# Patient Record
Sex: FEMALE | Race: BLACK OR AFRICAN AMERICAN | NOT HISPANIC OR LATINO | URBAN - METROPOLITAN AREA
[De-identification: names, ages, dates, MRNs, and addresses within clinical notes are randomized per-mention and may not be internally consistent; named-entity substitution may affect disease eponyms.]

---

## 2017-03-22 ENCOUNTER — HOSPITAL ENCOUNTER (EMERGENCY)
Facility: HOSPITAL | Age: 27
Discharge: HOME | End: 2017-03-22
Payer: MEDICAID

## 2017-03-22 VITALS
SYSTOLIC BLOOD PRESSURE: 128 MMHG | WEIGHT: 156.94 LBS | HEART RATE: 94 BPM | TEMPERATURE: 99 F | DIASTOLIC BLOOD PRESSURE: 92 MMHG | BODY MASS INDEX: 26.79 KG/M2 | OXYGEN SATURATION: 100 % | RESPIRATION RATE: 17 BRPM | HEIGHT: 64 IN

## 2017-03-22 DIAGNOSIS — K08.89 PAIN, DENTAL: Primary | ICD-10-CM

## 2017-03-22 PROCEDURE — 99283 EMERGENCY DEPT VISIT LOW MDM: CPT

## 2017-03-22 RX ORDER — PENICILLIN V POTASSIUM 500 MG/1
500 TABLET, FILM COATED ORAL 4 TIMES DAILY
Qty: 28 TABLET | Refills: 0 | Status: SHIPPED | OUTPATIENT
Start: 2017-03-22 | End: 2017-03-29

## 2017-03-22 RX ORDER — DICLOFENAC SODIUM 25 MG/1
25 TABLET, DELAYED RELEASE ORAL 3 TIMES DAILY
Qty: 20 TABLET | Refills: 0 | Status: SHIPPED | OUTPATIENT
Start: 2017-03-22 | End: 2017-05-29

## 2017-03-22 NOTE — ED NOTES
Armband checked, patient verified. Verified by patient spelling and stating the correct name on armband along with correct date of birth.

## 2017-03-22 NOTE — ED AVS SNAPSHOT
[facility]  [address] [address]  [place] [address]               Patricia Melendez   3/22/2017  4:16 PM   ED    Description:  Female : 1990   Department:  [facility]           Your Care was Coordinated By:     Provider Role From To    HANNAH Alcantar Physician Assistant 17 [id number] --      Reason for Visit     Dental Pain           Diagnoses this Visit        Comments    Pain, dental    -  Primary       ED Disposition     None           To Do List           Follow-up Information     Follow up with Kelvin Brothers MD In 2 days.    Specialty:  Family Medicine    Why:  As needed, If symptoms worsen return to ED     Contact information:    [address] [facility]  [place] [address]  [phone]         These Medications        Disp Refills Start End    penicillin v potassium (VEETID) 500 MG tablet 28 tablet 0 3/22/2017 3/29/2017    Take 1 tablet (500 mg total) by mouth 4 (four) times daily. - Oral    Pharmacy: [facility]/pharmacy #[id number] - [place] - [address] [facility] Ph #: [phone]       diclofenac (VOLTAREN) 25 MG TbEC 20 tablet 0 3/22/2017     Take 1 tablet (25 mg total) by mouth 3 (three) times daily. - Oral    Pharmacy: [facility]/pharmacy #[id number] - [place], [place] - [address] [facility] Ph #: [phone]         [facility]     [facility] -  Assistance  Registered nurses in the [facility] provide clinical advisement, health education, appointment booking, and other advisory services.  Call for this free service at [phone].             Medications           Message regarding Medications     Verify the changes and/or additions to your medication regime listed below are the same as discussed with your clinician today.  If any of these changes or additions are incorrect, please notify your healthcare provider.        START taking these NEW medications        Refills    penicillin v  "potassium (VEETID) 500 MG tablet 0    Sig: Take 1 tablet (500 mg total) by mouth 4 (four) times daily.    Class: Print    Route: Oral    diclofenac (VOLTAREN) 25 MG TbEC 0    Sig: Take 1 tablet (25 mg total) by mouth 3 (three) times daily.    Class: Print    Route: Oral           Verify that the below list of medications is an accurate representation of the medications you are currently taking.  If none reported, the list may be blank. If incorrect, please contact your healthcare provider. Carry this list with you in case of emergency.           Current Medications     diclofenac (VOLTAREN) 25 MG TbEC Take 1 tablet (25 mg total) by mouth 3 (three) times daily.    hydrocodone-acetaminophen 5-325mg (NORCO) 5-325 mg per tablet Take 1 tablet by mouth every 4 (four) hours as needed for Pain.    naproxen (NAPROSYN) 375 MG tablet Take 1 tablet (375 mg total) by mouth 2 (two) times daily with meals.    ondansetron (ZOFRAN) 4 MG tablet Take 1 tablet (4 mg total) by mouth every 8 (eight) hours as needed.    penicillin v potassium (VEETID) 500 MG tablet Take 1 tablet (500 mg total) by mouth 4 (four) times daily.           Clinical Reference Information           Your Vitals Were     BP Pulse Temp Resp Height Weight    128/92 (BP Location: Left arm, Patient Position: Sitting) 94 98.8 °F (37.1 °C) (Oral) 17 5' 4" (1.626 m) 71.2 kg (156 lb 15.5 oz)    SpO2 BMI             100% 26.94 kg/m2         Allergies as of 3/22/2017     No Known Allergies      Immunizations Administered on Date of Encounter - 3/22/2017     None      ED Micro, Lab, POCT     None      ED Imaging Orders     None      Discharge References/Attachments     DENTAL PAIN (ENGLISH)      [facility] Sign-Up     Activating your [facility] account is as easy as 1-2-3!     1) Visit [email or web address], select Sign Up Now, enter this activation code and your date of birth, then select Next.  [email or web address]  Expires: 5/6/2017  4:25 PM      2) Create a username and password to " use when you visit [facility] in the future and select a security question in case you lose your password and select Next.    3) Enter your e-mail address and click Sign Up!    Additional Information  If you have questions, please e-mail [email or web address] or call [phone] to talk to our [facility] staff. Remember, [facility] is NOT to be used for urgent needs. For medical emergencies, dial 911.         Smoking Cessation     If you would like to quit smoking:   You may be eligible for free services if you are a [place] resident and started smoking cigarettes before September 1, 1988.  Call the Smoking Cessation [employer] ([facility]) toll free at [phone] or ([phone] 788-6467.   Call 0-189-QUIT-NOW if you do not meet the above criteria.             [facility] complies with applicable Federal civil rights laws and does not discriminate on the basis of race, color, national origin, age, disability, or sex.        Language Assistance Services     ATTENTION: Language assistance services are available, free of charge. Please call [phone].      ATENCIÓN: Si habla español, tiene a lugo disposición servicios gratuitos de asistencia lingüística. Llame al [phone].     CHÚ Ý: N?u b?n nói Ti?ng Vi?t, có các d?ch v? h? tr? ngôn ng? mi?n phí dành cho b?n. G?i s? [phone].

## 2017-03-22 NOTE — ED PROVIDER NOTES
Encounter Date: 3/22/2017       History     Chief Complaint   Patient presents with    Dental Pain     L sided mouth pain since yesterday     Review of patient's allergies indicates:  No Known Allergies  HPI Comments: Pt reports to ED c/o dental pain that has been ongoing since yesterday. Pt says pain is located to bottom left side and reports swelling to left side of her face.  No alleviating factors. Pain is exacerbated with palpation of the area and eating.  Pt says she has not seen a dentist in over a year. Pt denies any fever, trismus, nausea, vomiting, CP, SOB, dysphagia, or any other associated sxs.      The history is provided by the patient.     Past Medical History:   Diagnosis Date    Asthma     Hypertension      Past Surgical History:   Procedure Laterality Date    NO PAST SURGERIES       Family History   Problem Relation Age of Onset    Hypertension Mother     Diabetes Maternal Grandmother      Social History   Substance Use Topics    Smoking status: Current Every Day Smoker     Packs/day: 0.50     Types: Cigarettes    Smokeless tobacco: Never Used    Alcohol use Yes      Comment: occassionally      Review of Systems   Constitutional: Negative for fever.   HENT: Positive for dental problem and facial swelling. Negative for ear pain, sore throat, trouble swallowing and voice change.    Respiratory: Negative for shortness of breath.    Cardiovascular: Negative for chest pain.   Gastrointestinal: Negative for nausea.   Genitourinary: Negative for dysuria.   Musculoskeletal: Negative for back pain.   Skin: Negative for rash.   Neurological: Negative for weakness.   Hematological: Does not bruise/bleed easily.   All other systems reviewed and are negative.      Physical Exam   Initial Vitals   BP Pulse Resp Temp SpO2   03/22/17 1613 03/22/17 1613 03/22/17 1613 03/22/17 1613 03/22/17 1613   128/92 94 17 98.8 °F (37.1 °C) 100 %     Physical Exam    Nursing note and vitals reviewed.  Constitutional:  She appears well-developed and well-nourished.   HENT:   Head: Normocephalic and atraumatic.   Mouth/Throat: Uvula is midline. No oral lesions. No trismus in the jaw. Dental caries present. No dental abscesses or uvula swelling.       Cracked tooth on bottom left side  No swelling below the angle of the mandible, no facial swelling appreciated.  Tenderness to palpation over left mandibular area.     Eyes: EOM are normal. Pupils are equal, round, and reactive to light.   Neck: Normal range of motion. Neck supple.   Cardiovascular: Normal rate and regular rhythm.   Pulmonary/Chest: Breath sounds normal.   Abdominal: Soft.   Musculoskeletal: Normal range of motion.   Neurological: She is alert and oriented to person, place, and time.         ED Course   Procedures  Labs Reviewed - No data to display                            ED Course     Clinical Impression:   The encounter diagnosis was Pain, dental.          HANNAH Alcantar  03/22/17 6010

## 2017-05-29 ENCOUNTER — HOSPITAL ENCOUNTER (EMERGENCY)
Facility: HOSPITAL | Age: 27
Discharge: HOME | End: 2017-05-29
Attending: EMERGENCY MEDICINE
Payer: MEDICAID

## 2017-05-29 VITALS
RESPIRATION RATE: 16 BRPM | HEIGHT: 64 IN | WEIGHT: 169 LBS | OXYGEN SATURATION: 100 % | SYSTOLIC BLOOD PRESSURE: 123 MMHG | TEMPERATURE: 98 F | DIASTOLIC BLOOD PRESSURE: 88 MMHG | HEART RATE: 83 BPM | BODY MASS INDEX: 28.85 KG/M2

## 2017-05-29 DIAGNOSIS — L28.2 PRURITIC RASH: ICD-10-CM

## 2017-05-29 DIAGNOSIS — B35.4 TINEA CORPORIS: Primary | ICD-10-CM

## 2017-05-29 PROCEDURE — 99283 EMERGENCY DEPT VISIT LOW MDM: CPT

## 2017-05-29 RX ORDER — GRISEOFULVIN 125 MG/1
250 TABLET ORAL DAILY
Qty: 21 TABLET | Refills: 0 | Status: SHIPPED | OUTPATIENT
Start: 2017-05-29 | End: 2017-06-19

## 2017-05-29 RX ORDER — PRENATAL VIT 91/IRON/FOLIC/DHA 28-975-200
COMBINATION PACKAGE (EA) ORAL
Qty: 15 G | Refills: 0 | Status: SHIPPED | OUTPATIENT
Start: 2017-05-29 | End: 2019-09-07

## 2017-05-29 NOTE — ED PROVIDER NOTES
Encounter Date: 5/29/2017       History     Chief Complaint   Patient presents with    Rash     generalized with itching.  also c/o cough     Review of patient's allergies indicates:  No Known Allergies    The history is provided by the patient.   Rash    This is a new problem. The current episode started several days ago (approximately one week ago). The problem has been gradually worsening. The problem is associated with an unknown factor. The rash is present on the torso and right upper leg. The pain is at a severity of 0/10. Associated symptoms include itching. Pertinent negatives include no blisters and no weeping. She has tried nothing for the symptoms.       PCP:   Kelvin Brothers MD        Past Medical History:   Diagnosis Date    Asthma     Hypertension      Past Surgical History:   Procedure Laterality Date    NO PAST SURGERIES       Family History   Problem Relation Age of Onset    Hypertension Mother     Diabetes Maternal Grandmother      Social History   Substance Use Topics    Smoking status: Current Every Day Smoker     Packs/day: 0.50     Types: Cigarettes    Smokeless tobacco: Never Used    Alcohol use Yes      Comment: occassionally      Review of Systems   Constitutional: Negative for fever.   HENT: Negative for sore throat.    Respiratory: Negative for shortness of breath.    Cardiovascular: Negative for chest pain.   Gastrointestinal: Negative for nausea.   Genitourinary: Negative for dysuria.   Musculoskeletal: Negative for back pain.   Skin: Positive for itching and rash.   Neurological: Negative for weakness.   Hematological: Does not bruise/bleed easily.       Physical Exam     Initial Vitals [05/29/17 1313]   BP Pulse Resp Temp SpO2   123/88 83 16 98.3 °F (36.8 °C) 100 %     Physical Exam    Nursing note and vitals reviewed.  Constitutional: Vital signs are normal. She appears well-developed and well-nourished. She is cooperative. She does not appear ill. No distress.   HENT:    Head: Normocephalic and atraumatic.   Nose: Nose normal.   Mouth/Throat: Uvula is midline, oropharynx is clear and moist and mucous membranes are normal.   Eyes: Conjunctivae, EOM and lids are normal. Pupils are equal, round, and reactive to light.   Neck: Trachea normal and normal range of motion. Neck supple.   Cardiovascular: Normal rate, regular rhythm, intact distal pulses and normal pulses.   Pulmonary/Chest: Effort normal. No respiratory distress.   Abdominal: Soft. She exhibits no distension and no mass. There is no tenderness. There is no rebound and no guarding.   Musculoskeletal: Normal range of motion. She exhibits no edema or tenderness.   Neurological: She is alert and oriented to person, place, and time. She has normal strength. No cranial nerve deficit or sensory deficit. GCS eye subscore is 4. GCS verbal subscore is 5. GCS motor subscore is 6.   Neurovascular intact to all extremities.  Normal gait.    Skin: Skin is warm and dry. Capillary refill takes less than 2 seconds. Lesion noted. No rash noted. There is erythema (annular, erythematous, scaly plaque noted to left upper chest, right lower abdomen and right upper thigh - the annular lesions measure approximately 2 cm in diameter - no streaking erythema or drainage noted).        Psychiatric: She has a normal mood and affect. Her speech is normal and behavior is normal. Judgment and thought content normal. Cognition and memory are normal.         ED Course   Procedures            Medical Decision Making:   History:   Old Records Summarized: records from clinic visits.                     Clinical Impression:       ICD-10-CM ICD-9-CM   1. Tinea corporis B35.4 110.5   2. Pruritic rash L28.2 698.2         Disposition:   Disposition: Discharged  Condition: Stable  I discussed with patient that the evaluation in the emergency department does not suggest any emergent or life threatening medical condition requiring immediate intervention beyond what  was provided in the ED, and I believe patient is safe for discharge.  Regardless, an unremarkable evaluation in the ED does not preclude the development or presence of a serious of life threatening condition. As such, patient was instructed to return immediately for any worsening or change in current symptoms. I also discussed the results of my evaluation and diagnosis with patient and she concurs with the evaluation and management plan.  Detailed written and verbal instructions provided to patient and she expressed a verbal understanding of the discharge instructions and overall management plan. Reiterated the importance of medication administration and safety and advised patient to follow up with primary care provider in 3-5 days or sooner if needed.  Also advised patient to return to the ER for any complications.     Regarding TINEA CORPORIS, instructions were provided to help prevent further contamination, including: washing all items that come into contact with infected skin; avoid sharing personal items; keeping skin, hair, and nails clean and dry; and avoid coming into contact with others diagnosed or suspected to have a fungal infection. Instructions were also provided about contacting the primary care provider for any: fever; worsening of infection despite 7 days of treatment; rash still present after two weeks the area around the rash; becomes red, warm, tender, swollen, or smells bad; or if there are any questions or concerns about the treatment or condition being treated. For treatment, I reiterated the importance of taking all medications as prescribed and the need to follow up with primary care provider.        Discharge Medication List as of 5/29/2017  1:49 PM      START taking these medications    Details   griseofulvin (HIRAL-PEG) 250 MG tablet Take 1 tablet (250 mg total) by mouth once daily., Starting Mon 5/29/2017, Until Mon 6/19/2017, Normal      terbinafine HCl (LAMISIL) 1 % cream Apply  topically to affected area twice daily., Normal             Follow-up Information     Call  Kelvin Brothers MD.    Specialty:  Family Medicine  Why:  for follow up in 2 weeks OR sooner if needed  Contact information:  [address] [facility] [address] [phone]             Go to  [facility].    Specialty:  Urgent Care  Why:  As needed  Contact information:  [address] [facility] [address] [phone]                        Edenilson Grayson NP  05/29/17 9779

## 2018-04-15 ENCOUNTER — HOSPITAL ENCOUNTER (EMERGENCY)
Facility: HOSPITAL | Age: 28
Discharge: ANOTHER HOSPITAL | End: 2018-04-15
Attending: EMERGENCY MEDICINE
Payer: MEDICAID

## 2018-04-15 VITALS
WEIGHT: 171 LBS | HEART RATE: 108 BPM | OXYGEN SATURATION: 98 % | DIASTOLIC BLOOD PRESSURE: 81 MMHG | SYSTOLIC BLOOD PRESSURE: 132 MMHG | TEMPERATURE: 100 F | RESPIRATION RATE: 20 BRPM | BODY MASS INDEX: 29.35 KG/M2

## 2018-04-15 DIAGNOSIS — Z72.0 TOBACCO ABUSE: ICD-10-CM

## 2018-04-15 DIAGNOSIS — R25.2 TRISMUS: ICD-10-CM

## 2018-04-15 DIAGNOSIS — J02.9 PHARYNGITIS, UNSPECIFIED ETIOLOGY: Primary | ICD-10-CM

## 2018-04-15 DIAGNOSIS — J36 PERITONSILLAR ABSCESS: ICD-10-CM

## 2018-04-15 DIAGNOSIS — A41.9 SEPSIS, DUE TO UNSPECIFIED ORGANISM: ICD-10-CM

## 2018-04-15 LAB
ALBUMIN SERPL BCP-MCNC: 4 G/DL
ALP SERPL-CCNC: 92 U/L
ALT SERPL W/O P-5'-P-CCNC: 19 U/L
ANION GAP SERPL CALC-SCNC: 14 MMOL/L
AST SERPL-CCNC: 20 U/L
B-HCG UR QL: NEGATIVE
BASOPHILS # BLD AUTO: 0.03 K/UL
BASOPHILS NFR BLD: 0.2 %
BILIRUB SERPL-MCNC: 1.3 MG/DL
BILIRUB UR QL STRIP: ABNORMAL
BUN SERPL-MCNC: 6 MG/DL
CALCIUM SERPL-MCNC: 10 MG/DL
CHLORIDE SERPL-SCNC: 100 MMOL/L
CLARITY UR REFRACT.AUTO: CLEAR
CO2 SERPL-SCNC: 22 MMOL/L
COLOR UR AUTO: YELLOW
CREAT SERPL-MCNC: 0.6 MG/DL
DIFFERENTIAL METHOD: ABNORMAL
EOSINOPHIL # BLD AUTO: 0.1 K/UL
EOSINOPHIL NFR BLD: 0.3 %
ERYTHROCYTE [DISTWIDTH] IN BLOOD BY AUTOMATED COUNT: 13.8 %
EST. GFR  (AFRICAN AMERICAN): >60 ML/MIN/1.73 M^2
EST. GFR  (NON AFRICAN AMERICAN): >60 ML/MIN/1.73 M^2
GLUCOSE SERPL-MCNC: 106 MG/DL
GLUCOSE UR QL STRIP: NEGATIVE
HCT VFR BLD AUTO: 41.7 %
HETEROPH AB SERPL QL IA: NEGATIVE
HGB BLD-MCNC: 13.7 G/DL
HGB UR QL STRIP: ABNORMAL
KETONES UR QL STRIP: ABNORMAL
LACTATE SERPL-SCNC: 0.8 MMOL/L
LEUKOCYTE ESTERASE UR QL STRIP: NEGATIVE
LYMPHOCYTES # BLD AUTO: 1.7 K/UL
LYMPHOCYTES NFR BLD: 9.8 %
MCH RBC QN AUTO: 28 PG
MCHC RBC AUTO-ENTMCNC: 32.9 G/DL
MCV RBC AUTO: 85 FL
MONOCYTES # BLD AUTO: 1.6 K/UL
MONOCYTES NFR BLD: 9.3 %
NEUTROPHILS # BLD AUTO: 13.5 K/UL
NEUTROPHILS NFR BLD: 80 %
NITRITE UR QL STRIP: NEGATIVE
PH UR STRIP: 6 [PH] (ref 5–8)
PLATELET # BLD AUTO: 267 K/UL
PMV BLD AUTO: 10.5 FL
POTASSIUM SERPL-SCNC: 3.8 MMOL/L
PROCALCITONIN SERPL IA-MCNC: 0.03 NG/ML
PROT SERPL-MCNC: 8.3 G/DL
PROT UR QL STRIP: ABNORMAL
RBC # BLD AUTO: 4.89 M/UL
SODIUM SERPL-SCNC: 136 MMOL/L
SP GR UR STRIP: >=1.03 (ref 1–1.03)
URN SPEC COLLECT METH UR: ABNORMAL
UROBILINOGEN UR STRIP-ACNC: ABNORMAL EU/DL
WBC # BLD AUTO: 16.83 K/UL

## 2018-04-15 PROCEDURE — 25000003 PHARM REV CODE 250: Performed by: EMERGENCY MEDICINE

## 2018-04-15 PROCEDURE — 83605 ASSAY OF LACTIC ACID: CPT

## 2018-04-15 PROCEDURE — 81003 URINALYSIS AUTO W/O SCOPE: CPT

## 2018-04-15 PROCEDURE — 63600175 PHARM REV CODE 636 W HCPCS: Performed by: EMERGENCY MEDICINE

## 2018-04-15 PROCEDURE — 80053 COMPREHEN METABOLIC PANEL: CPT

## 2018-04-15 PROCEDURE — 87040 BLOOD CULTURE FOR BACTERIA: CPT | Mod: 59

## 2018-04-15 PROCEDURE — 85025 COMPLETE CBC W/AUTO DIFF WBC: CPT

## 2018-04-15 PROCEDURE — 86308 HETEROPHILE ANTIBODY SCREEN: CPT

## 2018-04-15 PROCEDURE — S0030 INJECTION, METRONIDAZOLE: HCPCS | Performed by: EMERGENCY MEDICINE

## 2018-04-15 PROCEDURE — 81025 URINE PREGNANCY TEST: CPT

## 2018-04-15 PROCEDURE — 99285 EMERGENCY DEPT VISIT HI MDM: CPT | Mod: 25

## 2018-04-15 PROCEDURE — 84145 PROCALCITONIN (PCT): CPT

## 2018-04-15 PROCEDURE — 25500020 PHARM REV CODE 255: Performed by: EMERGENCY MEDICINE

## 2018-04-15 PROCEDURE — 96374 THER/PROPH/DIAG INJ IV PUSH: CPT | Mod: 59

## 2018-04-15 PROCEDURE — 96361 HYDRATE IV INFUSION ADD-ON: CPT

## 2018-04-15 PROCEDURE — 96376 TX/PRO/DX INJ SAME DRUG ADON: CPT | Mod: 59

## 2018-04-15 PROCEDURE — 96375 TX/PRO/DX INJ NEW DRUG ADDON: CPT | Mod: 59

## 2018-04-15 RX ORDER — MORPHINE SULFATE 4 MG/ML
2 INJECTION, SOLUTION INTRAMUSCULAR; INTRAVENOUS
Status: COMPLETED | OUTPATIENT
Start: 2018-04-15 | End: 2018-04-15

## 2018-04-15 RX ORDER — ACETAMINOPHEN 160 MG/5ML
650 SOLUTION ORAL
Status: COMPLETED | OUTPATIENT
Start: 2018-04-15 | End: 2018-04-15

## 2018-04-15 RX ORDER — CEFTRIAXONE 1 G/1
1 INJECTION, POWDER, FOR SOLUTION INTRAMUSCULAR; INTRAVENOUS
Status: COMPLETED | OUTPATIENT
Start: 2018-04-15 | End: 2018-04-15

## 2018-04-15 RX ORDER — ONDANSETRON 2 MG/ML
4 INJECTION INTRAMUSCULAR; INTRAVENOUS
Status: COMPLETED | OUTPATIENT
Start: 2018-04-15 | End: 2018-04-15

## 2018-04-15 RX ORDER — MORPHINE SULFATE 2 MG/ML
2 INJECTION, SOLUTION INTRAMUSCULAR; INTRAVENOUS
Status: DISCONTINUED | OUTPATIENT
Start: 2018-04-15 | End: 2018-04-15

## 2018-04-15 RX ORDER — MORPHINE SULFATE 2 MG/ML
2 INJECTION, SOLUTION INTRAMUSCULAR; INTRAVENOUS
Status: DISCONTINUED | OUTPATIENT
Start: 2018-04-15 | End: 2018-04-15 | Stop reason: SDUPTHER

## 2018-04-15 RX ORDER — METRONIDAZOLE 500 MG/100ML
500 INJECTION, SOLUTION INTRAVENOUS
Status: DISCONTINUED | OUTPATIENT
Start: 2018-04-15 | End: 2018-04-16 | Stop reason: HOSPADM

## 2018-04-15 RX ORDER — SODIUM CHLORIDE 9 MG/ML
1000 INJECTION, SOLUTION INTRAVENOUS
Status: COMPLETED | OUTPATIENT
Start: 2018-04-15 | End: 2018-04-15

## 2018-04-15 RX ADMIN — ACETAMINOPHEN 649.6 MG: 160 SOLUTION ORAL at 03:04

## 2018-04-15 RX ADMIN — ONDANSETRON 4 MG: 2 INJECTION INTRAMUSCULAR; INTRAVENOUS at 03:04

## 2018-04-15 RX ADMIN — MORPHINE SULFATE 2 MG: 4 INJECTION, SOLUTION INTRAMUSCULAR; INTRAVENOUS at 07:04

## 2018-04-15 RX ADMIN — MORPHINE SULFATE 2 MG: 4 INJECTION INTRAVENOUS at 03:04

## 2018-04-15 RX ADMIN — METRONIDAZOLE 500 MG: 500 INJECTION, SOLUTION INTRAVENOUS at 03:04

## 2018-04-15 RX ADMIN — SODIUM CHLORIDE 1000 ML: 0.9 INJECTION, SOLUTION INTRAVENOUS at 03:04

## 2018-04-15 RX ADMIN — SODIUM CHLORIDE 1000 ML: 0.9 INJECTION, SOLUTION INTRAVENOUS at 07:04

## 2018-04-15 RX ADMIN — SODIUM CHLORIDE 1000 ML: 0.9 INJECTION, SOLUTION INTRAVENOUS at 05:04

## 2018-04-15 RX ADMIN — CEFTRIAXONE SODIUM 1 G: 1 INJECTION, POWDER, FOR SOLUTION INTRAMUSCULAR; INTRAVENOUS at 03:04

## 2018-04-15 RX ADMIN — IOHEXOL 75 ML: 350 INJECTION, SOLUTION INTRAVENOUS at 04:04

## 2018-04-15 NOTE — ED NOTES
Pt updated on acceptance at [facility] and intent to transport via [facility]. No questions asked at this time. No s/s of respiratory distress noted.

## 2018-04-15 NOTE — ED NOTES
Verified no ent on call list  and called [facility] [place]  to verify- none on call.Dr. Gonzalez notified.

## 2018-04-15 NOTE — ED PROVIDER NOTES
Encounter Date: 4/15/2018       History     Chief Complaint   Patient presents with    Sore Throat     sore throat for 2 days.      CHIEF COMPLIANT: Sore Throat (sore throat for 2 days. )      4/15/2018, 2:26 PM     The history is provided by the patient. Patricia Melendez is a 27 y.o. female presenting to the ED for sore throat.  Onset yesterday.  Patient reports that the pain is 10 out of 10.  In fact patient prefers to write her answers down than to actually voice her answers.  She reports that the pain is worsened with swallowing.  It is associated with nausea, body aches, chills.  Patient reports recent treatment for Chlamydia.  Patient denies any vomiting, dysuria, urgency, frequency, diarrhea, rash    PCP: Kelvin Brothers MD  Specialist:             Review of patient's allergies indicates:  No Known Allergies  Past Medical History:   Diagnosis Date    Asthma     Hypertension      Past Surgical History:   Procedure Laterality Date    NO PAST SURGERIES       Family History   Problem Relation Age of Onset    Hypertension Mother     Diabetes Maternal Grandmother      Social History   Substance Use Topics    Smoking status: Current Every Day Smoker     Packs/day: 0.50     Types: Cigarettes    Smokeless tobacco: Never Used    Alcohol use Yes      Comment: occassionally      Review of Systems   Constitutional: Negative for fever.   HENT: Positive for sore throat. Negative for congestion, postnasal drip and rhinorrhea.    Respiratory: Negative for cough and shortness of breath.    Cardiovascular: Negative for chest pain.   Gastrointestinal: Negative for abdominal pain and nausea.   Genitourinary: Negative for dysuria.   Musculoskeletal: Negative for back pain.   Skin: Negative for rash.   Neurological: Negative for weakness.   Hematological: Does not bruise/bleed easily.       Physical Exam     Initial Vitals [04/15/18 1420]   BP Pulse Resp Temp SpO2   (!) 131/91 106 20 (!) 101 °F (38.3 °C) 100 %      MAP        104.33         Vitals:    04/15/18 1420 04/15/18 1505 04/15/18 1602   BP: (!) 131/91 (!) 134/91 138/77   Pulse: 106  99   Resp: 20  16   Temp: (!) 101 °F (38.3 °C)  100 °F (37.8 °C)   TempSrc: Oral  Oral   SpO2: 100%  97%   Weight: 77.6 kg (171 lb)         Physical Exam    Nursing note and vitals reviewed.  Constitutional: She appears well-developed and well-nourished.   Patient tearful crying.  She sitting in the chair.  She prefers to spit and used juice bottle.  There appears to be a hot potato voice.  There appears to be trismus.  No stridor.   HENT:   Head: Normocephalic and atraumatic.   Right Ear: External ear normal.   Left Ear: External ear normal.   Mouth/Throat: There is trismus in the jaw. No uvula swelling. Posterior oropharyngeal edema and posterior oropharyngeal erythema present.   Uvula midline.   Eyes: Conjunctivae and EOM are normal. Pupils are equal, round, and reactive to light.   Neck: Normal range of motion.   Cardiovascular: Normal rate, regular rhythm and normal heart sounds.   Pulmonary/Chest: Breath sounds normal. No respiratory distress.   Abdominal: Soft. Bowel sounds are normal. She exhibits no mass. There is no rebound and no guarding.   Musculoskeletal: Normal range of motion.   Lymphadenopathy:     She has cervical adenopathy (Right anterior chain.).   Neurological: She is alert and oriented to person, place, and time. She has normal strength. No cranial nerve deficit.   Cranial nerves II-XII intact   Skin: Skin is warm and dry.   Psychiatric: She has a normal mood and affect. Her speech is normal and behavior is normal. Thought content normal.         ED Course   Procedures  Labs Reviewed   CBC W/ AUTO DIFFERENTIAL - Abnormal; Notable for the following:        Result Value    WBC 16.83 (*)     Gran # (ANC) 13.5 (*)     Mono # 1.6 (*)     Gran% 80.0 (*)     Lymph% 9.8 (*)     All other components within normal limits   COMPREHENSIVE METABOLIC PANEL - Abnormal; Notable for the  following:     CO2 22 (*)     Total Bilirubin 1.3 (*)     All other components within normal limits   URINALYSIS - Abnormal; Notable for the following:     Specific Gravity, UA >=1.030 (*)     Protein, UA Trace (*)     Ketones, UA 2+ (*)     Bilirubin (UA) 1+ (*)     Occult Blood UA Trace (*)     Urobilinogen, UA 4.0-6.0 (*)     All other components within normal limits   CULTURE, BLOOD   CULTURE, BLOOD   HETEROPHILE AB SCREEN   PROCALCITONIN   LACTIC ACID, PLASMA   PREGNANCY TEST, URINE RAPID        Results for orders placed or performed during the hospital encounter of 04/15/18   Heterophile Ab Screen   Result Value Ref Range    Monospot Negative Negative   CBC auto differential   Result Value Ref Range    WBC 16.83 (H) 3.90 - 12.70 K/uL    RBC 4.89 4.00 - 5.40 M/uL    Hemoglobin 13.7 12.0 - 16.0 g/dL    Hematocrit 41.7 37.0 - 48.5 %    MCV 85 82 - 98 fL    MCH 28.0 27.0 - 31.0 pg    MCHC 32.9 32.0 - 36.0 g/dL    RDW 13.8 11.5 - 14.5 %    Platelets 267 150 - 350 K/uL    MPV 10.5 9.2 - 12.9 fL    Gran # (ANC) 13.5 (H) 1.8 - 7.7 K/uL    Lymph # 1.7 1.0 - 4.8 K/uL    Mono # 1.6 (H) 0.3 - 1.0 K/uL    Eos # 0.1 0.0 - 0.5 K/uL    Baso # 0.03 0.00 - 0.20 K/uL    Gran% 80.0 (H) 38.0 - 73.0 %    Lymph% 9.8 (L) 18.0 - 48.0 %    Mono% 9.3 4.0 - 15.0 %    Eosinophil% 0.3 0.0 - 8.0 %    Basophil% 0.2 0.0 - 1.9 %    Differential Method Automated    Comprehensive metabolic panel   Result Value Ref Range    Sodium 136 136 - 145 mmol/L    Potassium 3.8 3.5 - 5.1 mmol/L    Chloride 100 95 - 110 mmol/L    CO2 22 (L) 23 - 29 mmol/L    Glucose 106 70 - 110 mg/dL    BUN, Bld 6 6 - 20 mg/dL    Creatinine 0.6 0.5 - 1.4 mg/dL    Calcium 10.0 8.7 - 10.5 mg/dL    Total Protein 8.3 6.0 - 8.4 g/dL    Albumin 4.0 3.5 - 5.2 g/dL    Total Bilirubin 1.3 (H) 0.1 - 1.0 mg/dL    Alkaline Phosphatase 92 55 - 135 U/L    AST 20 10 - 40 U/L    ALT 19 10 - 44 U/L    Anion Gap 14 8 - 16 mmol/L    eGFR if African American >60.0 >60 mL/min/1.73 m^2    eGFR  if non African American >60.0 >60 mL/min/1.73 m^2   Procalcitonin   Result Value Ref Range    Procalcitonin 0.03 <0.25 ng/mL   Lactic acid, plasma   Result Value Ref Range    Lactate (Lactic Acid) 0.8 0.5 - 2.2 mmol/L   Urinalysis   Result Value Ref Range    Specimen UA Urine, Clean Catch     Color, UA Yellow Yellow, Straw, Luanne    Appearance, UA Clear Clear    pH, UA 6.0 5.0 - 8.0    Specific Gravity, UA >=1.030 (A) 1.005 - 1.030    Protein, UA Trace (A) Negative    Glucose, UA Negative Negative    Ketones, UA 2+ (A) Negative    Bilirubin (UA) 1+ (A) Negative    Occult Blood UA Trace (A) Negative    Nitrite, UA Negative Negative    Urobilinogen, UA 4.0-6.0 (A) <2.0 EU/dL    Leukocytes, UA Negative Negative   Pregnancy, urine rapid   Result Value Ref Range    Preg Test, Ur Negative      Imaging Results          CT Soft Tissue Neck With Contrast (Final result)  Result time 04/15/18 17:18:59    Final result by Sorin Brothers MD (04/15/18 17:18:59)                 Impression:     Circumferential prominence Waldeyer's lymphoid ringwith asymmetric swelling and ill-defined low attenuation right tonsillar pillar, 2 x 1.7 x 1.2 cm, characteristic of tonsillitis/phlegmon or early abscess without ring enhancement.  There is a similar smaller subtle area low attenuation left tonsillar pillar, 0.8 x 0.7 cm.  Bilaterally symmetric benign reactive adenopathy.       All CT scans at this facility use dose modulation, iterative reconstruction, and/or weight based dosing when appropriate to reduce radiation dose to as low as reasonably achievable.      Electronically signed by: SORIN BROTHERS  Date:     04/15/18  Time:    17:18              Narrative:    Exam: CT SOFT TISSUE NECK WITH CONTRAST     Indication: sore throat - trimus and drooling.  Looking for peritonsilar abscess      Technique: CT soft tissue neck performed with 3.75 mm axial imaging following 75 cc Omnipaque 350.  Coronal and sagittal reformations.    Comparison Study:  "None    Findings: There is asymmetric swelling and ill-defined low attenuation involving the right tonsillar pillar, 2.0 x 1.7 x 1.2 cm area.  There is also smaller subtle area of ill-defined low attenuation involving the left tonsillar pillar, 0.8 x 0.7 cm, without ring enhancement.      There is circumferential prominence of Waldeyer's lymphoid ring.      The tongue and floor of mouth musculature are normal.  Parapharyngeal spaces are well-preserved.  Normal larynx.  Subglottic airway is normal.  Lung apices are clear.      Normal thyroid gland.  Submandibular and parotid glands are normal.  There is no worrisome adenopathy.  There are symmetric benign reactive lymph nodes are noted submental, submandibular, jugulodigastric, and carotid space distribution, none of which are significantly enlarged by CT size criteria.    The regional vascular structures enhance normally.  There is a large mucosal cyst nearly completely filling the right sphenoid 2.6 cm.  There is a moderate dorsal thickening involving bilateral maxillary sinuses no maximal thickness 9 mm.  Old healed fracture deformity involving the left medial wall.  There are a couple dental carries.                               5:47 PM patient is tearful crying in the bed.  She does continue to spit in a old juice bottle".  I discussed results of CT scan.  Of note, tonsillar abscess is not excluded with a CT scan.  Unfortunately [facility] does not have ENT services available.  I do feel that she needs to be evaluated/consulted by ENT.  I discussed the indications for transfer.  Patient is requesting transfer to [facility].  I discussed with the patient and family members that patient requires ENT consultation.  Patient may not need to be admitted to the hospital.             All historical, clinical, radiographic, and laboratory findings were reviewed with the patient/family in detail.  I discussed the indications and treatment need (ENT) for transfer  to an outside " facility (rather than admission to our facility in [place]) secondary to no ENT services.  Patient/family verbalized understanding.   All remaining questions and concerns were addressed at that time and the patient/family agrees to proceed accordingly.  Similarly all pertinent details of the encounter were discussed with Leslie Nursing Supervisor at [facility].  Dr. Overton who agrees to accept the patient in transfer based on the needs/patient preferences outlined above.  Patient will be transferred by [facility] secondary to a need for ongoing fluids en route and pulse ox.  Risks:    loss of vitals signs, permanent neurologic damage, MVC ,  resulting in death,, or loss of neurologic function.  Benefits of transfer: ENT services.  Patient and family verbalized understanding.   Brinda Gonzalez, DO  6:29 PM    Pharyngitis, unspecified etiology    Sepsis, due to unspecified organism    Peritonsillar abscess    Trismus    Other orders  -     Heterophile Ab Screen; Standing  -     CBC auto differential; Standing  -     Comprehensive metabolic panel; Standing  -     Procalcitonin; Standing  -     Blood Culture #1 **CANNOT BE ORDERED STAT**; Standing  -     Blood Culture #2 **CANNOT BE ORDERED STAT**; Standing  -     Lactic acid, plasma; Standing  -     Urinalysis; Standing  -     Pregnancy, urine rapid; Standing  -     acetaminophen liquid 649.6 mg; Take 20.3 mLs (649.6 mg total) by mouth ED 1 Time.  -     CT Soft Tissue Neck With Contrast; Standing  -     cefTRIAXone injection 1 g; Inject 1 g into the vein ED 1 Time.  -     metronidazole IVPB 500 mg; Inject 100 mLs (500 mg total) into the vein every 8 (eight) hours.  -     Insert peripheral IV; Standing  -     sodium chloride 0.9% bolus 1,000 mL; Inject 1,000 mLs into the vein once.  -     Discontinue: morphine injection 2 mg; Inject 1 mL (2 mg total) into the vein ED 1 Time.  -     ondansetron injection 4 mg; Inject 4 mg into the vein ED 1 Time.  -      morphine injection 2 mg; Inject 0.5 mLs (2 mg total) into the vein ED 1 Time.  -     omnipaque 350 iohexol 75 mL; Inject 75 mLs into the vein ONCE PRN for contrast.  -     sodium chloride 0.9% bolus 1,000 mL; Inject 1,000 mLs into the vein once.  -     0.9%  NaCl infusion; Inject 1,000 mLs into the vein ED 1 Time.  -     morphine injection 2 mg; Inject 1 mL (2 mg total) into the vein ED 1 Time.                 Clinical Impression:       ICD-10-CM ICD-9-CM   1. Pharyngitis, unspecified etiology J02.9 462   2. Sepsis, due to unspecified organism A41.9 038.9     995.91   3. Peritonsillar abscess J36 475   4. Trismus R25.2 781.0         Disposition:   Disposition: Transferred  Condition: Stable                           Brinda CHRISTINE Gonzalez, DO  04/15/18 2156

## 2018-04-21 LAB
BACTERIA BLD CULT: NORMAL
BACTERIA BLD CULT: NORMAL

## 2018-09-21 ENCOUNTER — HOSPITAL ENCOUNTER (EMERGENCY)
Facility: HOSPITAL | Age: 28
Discharge: HOME | End: 2018-09-21
Attending: EMERGENCY MEDICINE
Payer: MEDICAID

## 2018-09-21 VITALS
BODY MASS INDEX: 28.78 KG/M2 | DIASTOLIC BLOOD PRESSURE: 90 MMHG | OXYGEN SATURATION: 100 % | RESPIRATION RATE: 18 BRPM | WEIGHT: 167.69 LBS | SYSTOLIC BLOOD PRESSURE: 137 MMHG | HEART RATE: 73 BPM | TEMPERATURE: 99 F

## 2018-09-21 DIAGNOSIS — J02.9 PHARYNGITIS, UNSPECIFIED ETIOLOGY: Primary | ICD-10-CM

## 2018-09-21 LAB — DEPRECATED S PYO AG THROAT QL EIA: NEGATIVE

## 2018-09-21 PROCEDURE — 87147 CULTURE TYPE IMMUNOLOGIC: CPT

## 2018-09-21 PROCEDURE — 87880 STREP A ASSAY W/OPTIC: CPT

## 2018-09-21 PROCEDURE — 99283 EMERGENCY DEPT VISIT LOW MDM: CPT

## 2018-09-21 PROCEDURE — 87081 CULTURE SCREEN ONLY: CPT

## 2018-09-21 RX ORDER — AMOXICILLIN 500 MG/1
500 CAPSULE ORAL 3 TIMES DAILY
Qty: 21 CAPSULE | Refills: 0 | Status: SHIPPED | OUTPATIENT
Start: 2018-09-21 | End: 2018-09-28

## 2018-09-21 NOTE — ED PROVIDER NOTES
"Encounter Date: 9/21/2018       History     Chief Complaint   Patient presents with    Sore Throat     "my tonsils are hurting" since last night. hx of tonsillar abscess     The history is provided by the patient.   Sore Throat   This is a new problem. The current episode started yesterday. The problem occurs constantly. The problem has not changed since onset.Pertinent negatives include no chest pain, no abdominal pain, no headaches and no shortness of breath. The symptoms are aggravated by drinking. Nothing relieves the symptoms. She has tried nothing for the symptoms. The treatment provided no relief.     Review of patient's allergies indicates:  No Known Allergies  Past Medical History:   Diagnosis Date    Asthma     Hypertension      Past Surgical History:   Procedure Laterality Date    NO PAST SURGERIES       Family History   Problem Relation Age of Onset    Hypertension Mother     Diabetes Maternal Grandmother      Social History     Tobacco Use    Smoking status: Current Every Day Smoker     Packs/day: 0.50     Types: Cigarettes    Smokeless tobacco: Never Used   Substance Use Topics    Alcohol use: Yes     Comment: occassionally     Drug use: No     Review of Systems   HENT: Positive for sore throat.    Respiratory: Negative for shortness of breath.    Cardiovascular: Negative for chest pain.   Gastrointestinal: Negative for abdominal pain.   Neurological: Negative for headaches.   All other systems reviewed and are negative.      Physical Exam     Initial Vitals [09/21/18 0714]   BP Pulse Resp Temp SpO2   (!) 137/90 73 18 98.7 °F (37.1 °C) 100 %      MAP       --         Physical Exam    Nursing note and vitals reviewed.  Constitutional: She appears well-developed and well-nourished. No distress.   HENT:   Head: Normocephalic and atraumatic.   Mouth/Throat: Uvula is midline and mucous membranes are normal. Oropharyngeal exudate and posterior oropharyngeal erythema present.   Eyes: EOM are normal. " Pupils are equal, round, and reactive to light.   Neck: Normal range of motion. Neck supple.   Cardiovascular: Normal rate, regular rhythm and normal heart sounds.   Pulmonary/Chest: Breath sounds normal. No respiratory distress. She has no wheezes.   Abdominal: Soft. Bowel sounds are normal.   Musculoskeletal: Normal range of motion.   Neurological: She is alert and oriented to person, place, and time. She has normal strength.   Skin: Skin is warm and dry.   Psychiatric: She has a normal mood and affect. Thought content normal.         ED Course   Procedures  Labs Reviewed   THROAT SCREEN, RAPID          Imaging Results    None     7:30 AM - Counseling: Spoke with the patient and discussed todays findings, in addition to providing specific details for the plan of care and counseling regarding the diagnosis and prognosis. Questions are answered at this time.                             Clinical Impression:   The encounter diagnosis was Pharyngitis, unspecified etiology.      Disposition:   Disposition: Discharged  Condition: Stable                        Yoni Aly MD  09/21/18 [id number]

## 2018-09-22 LAB — BACTERIA THROAT CULT: NORMAL

## 2019-02-24 ENCOUNTER — HOSPITAL ENCOUNTER (EMERGENCY)
Facility: HOSPITAL | Age: 29
Discharge: HOME | End: 2019-02-25
Attending: EMERGENCY MEDICINE
Payer: MEDICAID
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
## 2019-02-24 DIAGNOSIS — S09.90XA HEAD TRAUMA: ICD-10-CM

## 2019-02-24 DIAGNOSIS — S16.1XXA STRAIN OF NECK MUSCLE, INITIAL ENCOUNTER: ICD-10-CM

## 2019-02-24 DIAGNOSIS — F10.920 ALCOHOLIC INTOXICATION WITHOUT COMPLICATION: ICD-10-CM

## 2019-02-24 DIAGNOSIS — S01.112A LACERATION OF LEFT EYEBROW, INITIAL ENCOUNTER: Primary | ICD-10-CM

## 2019-02-24 LAB
ALBUMIN SERPL BCP-MCNC: 4.5 G/DL
ALP SERPL-CCNC: 87 U/L
ALT SERPL W/O P-5'-P-CCNC: 28 U/L
AMPHET+METHAMPHET UR QL: NEGATIVE
ANION GAP SERPL CALC-SCNC: 16 MMOL/L
AST SERPL-CCNC: 30 U/L
B-HCG UR QL: NEGATIVE
BARBITURATES UR QL SCN>200 NG/ML: NEGATIVE
BASOPHILS # BLD AUTO: 0.06 K/UL
BASOPHILS NFR BLD: 0.7 %
BENZODIAZ UR QL SCN>200 NG/ML: NEGATIVE
BILIRUB SERPL-MCNC: 0.3 MG/DL
BNP SERPL-MCNC: <10 PG/ML
BUN SERPL-MCNC: 6 MG/DL
BZE UR QL SCN: NEGATIVE
CALCIUM SERPL-MCNC: 9.4 MG/DL
CANNABINOIDS UR QL SCN: NEGATIVE
CHLORIDE SERPL-SCNC: 104 MMOL/L
CK SERPL-CCNC: 113 U/L
CO2 SERPL-SCNC: 23 MMOL/L
CREAT SERPL-MCNC: 0.7 MG/DL
CREAT UR-MCNC: 14.9 MG/DL
DIFFERENTIAL METHOD: ABNORMAL
EOSINOPHIL # BLD AUTO: 0.2 K/UL
EOSINOPHIL NFR BLD: 1.9 %
ERYTHROCYTE [DISTWIDTH] IN BLOOD BY AUTOMATED COUNT: 14.7 %
EST. GFR  (AFRICAN AMERICAN): >60 ML/MIN/1.73 M^2
EST. GFR  (NON AFRICAN AMERICAN): >60 ML/MIN/1.73 M^2
ETHANOL SERPL-MCNC: 249 MG/DL
ETHANOL SERPL-MCNC: 403 MG/DL
GLUCOSE SERPL-MCNC: 126 MG/DL
HCT VFR BLD AUTO: 43.7 %
HGB BLD-MCNC: 13.9 G/DL
INR PPP: 0.9
LYMPHOCYTES # BLD AUTO: 4.1 K/UL
LYMPHOCYTES NFR BLD: 50.8 %
MCH RBC QN AUTO: 27.6 PG
MCHC RBC AUTO-ENTMCNC: 31.8 G/DL
MCV RBC AUTO: 87 FL
METHADONE UR QL SCN>300 NG/ML: NEGATIVE
MONOCYTES # BLD AUTO: 0.6 K/UL
MONOCYTES NFR BLD: 7 %
NEUTROPHILS # BLD AUTO: 3.2 K/UL
NEUTROPHILS NFR BLD: 39.5 %
OPIATES UR QL SCN: NEGATIVE
PCP UR QL SCN>25 NG/ML: NEGATIVE
PLATELET # BLD AUTO: 351 K/UL
PMV BLD AUTO: 9.7 FL
POTASSIUM SERPL-SCNC: 3.9 MMOL/L
PROT SERPL-MCNC: 8.8 G/DL
PROTHROMBIN TIME: 9.4 SEC
RBC # BLD AUTO: 5.03 M/UL
SODIUM SERPL-SCNC: 143 MMOL/L
TOXICOLOGY INFORMATION: ABNORMAL
TROPONIN I SERPL DL<=0.01 NG/ML-MCNC: <0.006 NG/ML
WBC # BLD AUTO: 8.01 K/UL

## 2019-02-24 PROCEDURE — 90715 TDAP VACCINE 7 YRS/> IM: CPT | Mod: ER | Performed by: EMERGENCY MEDICINE

## 2019-02-24 PROCEDURE — 12011 RPR F/E/E/N/L/M 2.5 CM/<: CPT | Mod: ER

## 2019-02-24 PROCEDURE — 85025 COMPLETE CBC W/AUTO DIFF WBC: CPT | Mod: ER

## 2019-02-24 PROCEDURE — 25000003 PHARM REV CODE 250: Mod: ER | Performed by: EMERGENCY MEDICINE

## 2019-02-24 PROCEDURE — 99900035 HC TECH TIME PER 15 MIN (STAT): Mod: ER

## 2019-02-24 PROCEDURE — 90471 IMMUNIZATION ADMIN: CPT | Mod: ER | Performed by: EMERGENCY MEDICINE

## 2019-02-24 PROCEDURE — 85610 PROTHROMBIN TIME: CPT | Mod: ER

## 2019-02-24 PROCEDURE — 25500020 PHARM REV CODE 255: Mod: ER | Performed by: EMERGENCY MEDICINE

## 2019-02-24 PROCEDURE — 99285 EMERGENCY DEPT VISIT HI MDM: CPT | Mod: 25,ER

## 2019-02-24 PROCEDURE — 93010 EKG 12-LEAD: ICD-10-PCS | Mod: ,,, | Performed by: INTERNAL MEDICINE

## 2019-02-24 PROCEDURE — 83880 ASSAY OF NATRIURETIC PEPTIDE: CPT | Mod: ER

## 2019-02-24 PROCEDURE — 93005 ELECTROCARDIOGRAM TRACING: CPT | Mod: ER,59

## 2019-02-24 PROCEDURE — 80320 DRUG SCREEN QUANTALCOHOLS: CPT | Mod: ER

## 2019-02-24 PROCEDURE — 81025 URINE PREGNANCY TEST: CPT | Mod: ER

## 2019-02-24 PROCEDURE — 80307 DRUG TEST PRSMV CHEM ANLYZR: CPT | Mod: ER

## 2019-02-24 PROCEDURE — 63600175 PHARM REV CODE 636 W HCPCS: Mod: ER | Performed by: EMERGENCY MEDICINE

## 2019-02-24 PROCEDURE — 80320 DRUG SCREEN QUANTALCOHOLS: CPT | Mod: 91,ER

## 2019-02-24 PROCEDURE — 93010 ELECTROCARDIOGRAM REPORT: CPT | Mod: ,,, | Performed by: INTERNAL MEDICINE

## 2019-02-24 PROCEDURE — 82550 ASSAY OF CK (CPK): CPT | Mod: ER

## 2019-02-24 PROCEDURE — 96361 HYDRATE IV INFUSION ADD-ON: CPT | Mod: ER

## 2019-02-24 PROCEDURE — 80053 COMPREHEN METABOLIC PANEL: CPT | Mod: ER

## 2019-02-24 PROCEDURE — 96360 HYDRATION IV INFUSION INIT: CPT | Mod: ER,59

## 2019-02-24 PROCEDURE — 84484 ASSAY OF TROPONIN QUANT: CPT | Mod: ER

## 2019-02-24 RX ADMIN — SODIUM CHLORIDE 1000 ML: 0.9 INJECTION, SOLUTION INTRAVENOUS at 06:02

## 2019-02-24 RX ADMIN — IOHEXOL 75 ML: 350 INJECTION, SOLUTION INTRAVENOUS at 05:02

## 2019-02-24 RX ADMIN — SODIUM CHLORIDE 1000 ML: 0.9 INJECTION, SOLUTION INTRAVENOUS at 04:02

## 2019-02-24 RX ADMIN — CLOSTRIDIUM TETANI TOXOID ANTIGEN (FORMALDEHYDE INACTIVATED), CORYNEBACTERIUM DIPHTHERIAE TOXOID ANTIGEN (FORMALDEHYDE INACTIVATED), BORDETELLA PERTUSSIS TOXOID ANTIGEN (GLUTARALDEHYDE INACTIVATED), BORDETELLA PERTUSSIS FILAMENTOUS HEMAGGLUTININ ANTIGEN (FORMALDEHYDE INACTIVATED), BORDETELLA PERTUSSIS PERTACTIN ANTIGEN, AND BORDETELLA PERTUSSIS FIMBRIAE 2/3 ANTIGEN 0.5 ML: 5; 2; 2.5; 5; 3; 5 INJECTION, SUSPENSION INTRAMUSCULAR at 05:02

## 2019-02-24 NOTE — ED NOTES
Pt returned to room. Informed mother came  leroy and very thankful and aware mother has credit card.

## 2019-02-24 NOTE — ED NOTES
Rowan martínez here at [facility] and picked up leroy . Credit care that pts mother gave him and he place in pocket given to pts mother.

## 2019-02-24 NOTE — ED PROVIDER NOTES
Encounter Date: 2/24/2019       History     Chief Complaint   Patient presents with    Fall     Patient reports she slipped on tile. Laceration noted to left eye brow area. Patient altered in triage denies etoh or substance abuse.      The history is provided by the patient.   Fall   The accident occurred just prior to arrival. The fall occurred while walking. She fell from a height of 3 to 5 ft. She landed on a hard floor. The volume of blood lost was minimal. The point of impact was the face (left eyebrow). The pain is present in the face. Pain scale: mild. She was ambulatory at the scene. There was no entrapment after the fall. There was no drug use involved in the accident. There was alcohol use (pt denies etoh use, but pt smells of etoh.  pt then states she drank etoh yesterday) involved in the accident. Pertinent negatives include no back pain, no paresthesias, no paralysis, no visual change, no fever, no numbness, no abdominal pain, no bowel incontinence, no nausea, no vomiting, no hematuria, no hearing loss and no tingling. The symptoms are aggravated by activity. She has tried nothing for the symptoms. The treatment provided no relief.   unknown last tetanus.    Review of patient's allergies indicates:  No Known Allergies  Past Medical History:   Diagnosis Date    Asthma     Hypertension      Past Surgical History:   Procedure Laterality Date    NO PAST SURGERIES       Family History   Problem Relation Age of Onset    Hypertension Mother     Diabetes Maternal Grandmother      Social History     Tobacco Use    Smoking status: Current Every Day Smoker     Packs/day: 0.50     Types: Cigarettes    Smokeless tobacco: Never Used   Substance Use Topics    Alcohol use: Yes     Comment: occassionally     Drug use: No     Review of Systems   Constitutional: Negative for fever.   HENT: Negative for sore throat.    Respiratory: Negative for shortness of breath.    Cardiovascular: Negative for chest pain.    Gastrointestinal: Negative for abdominal pain, bowel incontinence, nausea and vomiting.   Genitourinary: Negative for dysuria and hematuria.   Musculoskeletal: Negative for back pain.   Skin: Negative for rash.   Neurological: Negative for tingling, weakness, numbness and paresthesias.   Hematological: Does not bruise/bleed easily.   All other systems reviewed and are negative.      Physical Exam     Initial Vitals [02/24/19 1546]   BP Pulse Resp Temp SpO2   (!) 132/91 107 16 99.3 °F (37.4 °C) 99 %      MAP       --         Physical Exam    Nursing note and vitals reviewed.  Constitutional: She appears well-developed and well-nourished. She is uncooperative.   HENT:   Head: Normocephalic. Head is with laceration (1 cm over left eyebrow).       Right Ear: Hearing, tympanic membrane, external ear and ear canal normal.   Left Ear: Hearing, tympanic membrane, external ear and ear canal normal.   Nose: Nose normal.   Mouth/Throat: Uvula is midline, oropharynx is clear and moist and mucous membranes are normal. No oropharyngeal exudate.   Smells of etoh   Eyes: Conjunctivae, EOM and lids are normal. Pupils are equal, round, and reactive to light.   Neck: Trachea normal, normal range of motion and phonation normal. Neck supple. No thyromegaly present. Muscular tenderness (in area diagrammed.  no midline ttp) present. No spinous process tenderness present.       Cardiovascular: Normal rate, regular rhythm, normal heart sounds and intact distal pulses. Exam reveals no gallop and no friction rub.    No murmur heard.  Pulmonary/Chest: Effort normal and breath sounds normal. No respiratory distress. She has no decreased breath sounds. She has no wheezes. She has no rhonchi. She exhibits bony tenderness (in area diagrammed.  reproduces pt's complaint.  no palpable fractures/deformities).       Abdominal: Soft. Bowel sounds are normal. She exhibits no distension. There is generalized tenderness. There is no rebound, no  guarding, no tenderness at McBurney's point and negative Cardona's sign. No hernia.   Musculoskeletal: Normal range of motion. She exhibits no edema or tenderness.        Right shoulder: Normal.        Left shoulder: Normal.        Right elbow: Normal.       Left elbow: Normal.        Right wrist: Normal.        Left wrist: Normal.        Right hip: Normal.        Left hip: Normal.        Right knee: Normal.        Left knee: Normal.        Right ankle: Normal. She exhibits normal pulse. Achilles tendon normal.        Left ankle: Normal. She exhibits normal pulse. Achilles tendon normal.        Cervical back: Normal.        Thoracic back: Normal.        Lumbar back: Normal.        Right upper arm: Normal.        Left upper arm: Normal.        Right forearm: Normal.        Left forearm: Normal.        Right hand: Normal. She exhibits normal capillary refill. Normal sensation noted. Normal strength noted.        Left hand: Normal. She exhibits normal capillary refill. Normal sensation noted. Normal strength noted.        Right upper leg: Normal.        Left upper leg: Normal.        Right lower leg: Normal.        Left lower leg: Normal.        Right foot: Normal.        Left foot: Normal.   Lymphadenopathy:     She has no cervical adenopathy.   Neurological: She is alert and oriented to person, place, and time. She has normal strength. No cranial nerve deficit or sensory deficit.   Skin: Skin is warm and dry. No rash noted.   Psychiatric: She has a normal mood and affect. Her behavior is normal. Judgment and thought content normal.         ED Course   Lac Repair  Date/Time: 2/24/2019 4:14 PM  Performed by: Sean Woo Jr., MD  Authorized by: Sean Woo Jr., MD   Consent Done: Yes  Consent: Verbal consent obtained.  Risks and benefits: risks, benefits and alternatives were discussed  Consent given by: patient  Patient understanding: patient states understanding of the procedure being performed  Patient consent:  "the patient's understanding of the procedure matches consent given  Procedure consent: procedure consent matches procedure scheduled  Relevant documents: relevant documents present and verified  Site marked: the operative site was marked  Patient identity confirmed: , MRN, name, provided demographic data and verbally with patient  Time out: Immediately prior to procedure a "time out" was called to verify the correct patient, procedure, equipment, support staff and site/side marked as required.  Body area: head/neck  Location details: left eyebrow  Laceration length: 1 cm  Contamination: The wound is contaminated.  Foreign bodies: no foreign bodies  Tendon involvement: none  Nerve involvement: none  Vascular damage: no  Preparation: Patient was prepped and draped in the usual sterile fashion.  Irrigation solution: saline  Amount of cleaning: standard  Debridement: none  Degree of undermining: none  Skin closure: glue  Technique: simple  Approximation: close  Approximation difficulty: simple  Dressing: dressing applied  Patient tolerance: Patient tolerated the procedure well with no immediate complications        Labs Reviewed   CBC W/ AUTO DIFFERENTIAL - Abnormal; Notable for the following components:       Result Value    MCHC 31.8 (*)     RDW 14.7 (*)     Platelets 351 (*)     Lymph% 50.8 (*)     All other components within normal limits   COMPREHENSIVE METABOLIC PANEL - Abnormal; Notable for the following components:    Glucose 126 (*)     Total Protein 8.8 (*)     All other components within normal limits   ALCOHOL,MEDICAL (ETHANOL) - Abnormal; Notable for the following components:    Alcohol, Medical, Serum 403 (*)     All other components within normal limits    Narrative:      Alcohol critical result(s) called and verbal readback obtained from   Libertad Francis, 2019 16:38   DRUG SCREEN PANEL, URINE EMERGENCY - Abnormal; Notable for the following components:    Creatinine, Random Ur 14.9 (*)     All " other components within normal limits   ALCOHOL,MEDICAL (ETHANOL) - Abnormal; Notable for the following components:    Alcohol, Medical, Serum 249 (*)     All other components within normal limits   PROTIME-INR   TROPONIN I   B-TYPE NATRIURETIC PEPTIDE   PREGNANCY TEST, URINE RAPID   CK     EKG Readings: (Independently Interpreted)   Initial Reading: No STEMI. Rhythm: Sinus Tachycardia. Heart Rate: 101. Ectopy: No Ectopy. Conduction: Normal. ST Segments: Normal ST Segments. T Waves: Normal. Axis: Normal. Clinical Impression: Sinus Tachycardia     ECG Results          EKG 12-lead (Final result)  Result time 02/25/19 11:19:49    Final result by Interface, Lab In [facility] (02/25/19 11:19:49)                 Narrative:    Test Reason :     Vent. Rate : 101 BPM     Atrial Rate : 101 BPM     P-R Int : 162 ms          QRS Dur : 084 ms      QT Int : 362 ms       P-R-T Axes : 052 025 025 degrees     QTc Int : 469 ms    Sinus tachycardia  Nonspecific T wave abnormality  Abnormal ECG  When compared with ECG of 05-MAR-2015 19:01,  Nonspecific T wave abnormality has replaced inverted T waves in Anterior  leads  Confirmed by STEPHANIE SUNG ([id number]) on 2/25/2019 11:19:40 AM    Referred By: [email or web address]   SELF           Confirmed By:STEPHANIE SUNG                            Imaging Results          CT Cervical Spine Without Contrast (Final result)  Result time 02/24/19 17:47:57    Final result by Henok Reyes MD (02/24/19 17:47:57)                 Impression:      Straightening of the normal cervical lordosis can be seen with patient positioning or muscular spasm.    All CT scans at this facility use dose modulation, iterative reconstruction, and/or weight base dosing when appropriate to reduce radiation dose to as low as reasonably achievable.      Electronically signed by: Henok Reyes MD  Date:    02/24/2019  Time:    17:47             Narrative:    EXAMINATION:  CT CERVICAL SPINE WITHOUT CONTRAST    CLINICAL HISTORY:  Neck pain, first  study;    TECHNIQUE:  2.5 mm axial noncontrast CT images were obtained through the cervical spine using soft tissue and bony algorithm so.  Sagittal coronal reformats were also submitted for interpretation.    COMPARISON:  None    FINDINGS:  Straightening of the normal cervical lordosis can be seen with patient positioning or muscular spasm.The vertebral body heights are well-maintained. Intervertebral disc space height is maintained. No fractures are identified. Prevertebral soft tissues are unremarkable.    Please note that routine CT of the spine is limited in its evaluation of extradural/epidural disease.    Shotty adenopathy seen throughout the neck.  Polyp or retention cyst suspected within the sphenoid sinus on the right filling the majority of the sinus.                               CT Maxillofacial Without Contrast (Final result)  Result time 02/24/19 17:57:17    Final result by Henok Reyes MD (02/24/19 17:57:17)                 Impression:      Minimally displaced nasal bone fracture suspected.    Sinus disease.    All CT scans at this facility use dose modulation, iterative reconstruction and/or weight based dosing when appropriate to reduce radiation dose to as low as reasonably achievable.      Electronically signed by: Henok Reyes MD  Date:    02/24/2019  Time:    17:57             Narrative:    EXAMINATION:  CT MAXILLOFACIAL WITHOUT CONTRAST    CLINICAL HISTORY:  Maxface trauma blunt;    TECHNIQUE:  Axial CT images through the facial bones were obtained without contrast.    COMPARISON:  None    FINDINGS:  Minimally displaced nasal bone fracture is suspected.  Remote fracture of the medial left orbital wall..    Orbits are intact.    Polyps or retention cysts within the maxillary and right sphenoid sinus with near complete opacification of the right sphenoid sinus.    Perinasal and perioral soft tissue swelling/thickening.    Shotty adenopathy.    Scattered odontogenic disease is noted.                                CT Chest Abdoment Pelvis With Contrast (Final result)  Result time 02/24/19 18:02:06    Final result by Henok Reyes MD (02/24/19 18:02:06)                 Impression:      No acute posttraumatic abnormalities.    Dermoid measuring 3.2 x 2.3 cm is suspected within the left adnexa. Recommend follow-up pelvic ultrasound    See above for additional findings.    All CT scans at this facility use dose modulation, iterative reconstruction and/or weight based dosing when appropriate to reduce radiation dose to as low as reasonably achievable.      Electronically signed by: Henok Reyes MD  Date:    02/24/2019  Time:    18:02             Narrative:    EXAMINATION:  CT CHEST ABDOMEN PELVIS WITH CONTRAST (XPD)    CLINICAL HISTORY:  Chest-abdomen-pelvis trauma, minor, blunt;    TECHNIQUE:  The patient was surveyed from the thoracic inlet through the pelvis after the administration of 75 cc Omni 350 IV contrast as well as oral contrast and data was reconstructed for coronal, sagittal, and axial images.    COMPARISON:  12/19/2016    FINDINGS:  The soft tissues at the base of the neck are unremarkable.  1.1 cm nodule within the inferior aspect of left thyroid lobe; recommend follow-up ultrasound.  There is no abnormal lymph node enlargement.  Shotty nodes are seen throughout the neck.    There is no axillary, mediastinal, or hilar lymphadenopathy.  The hilar contours are unremarkable.  The esophagus is normal in course and contour.    The thoracic aorta is normal in course, caliber, and contour without significant atherosclerotic calcifications.The heart does not appear enlarged and there is no pericardial effusion.    The trachea and proximal airways are patent.  The lungs are symmetrically expanded and demonstrate no convincing evidence of consolidation, pleural thickening, pneumothorax, or pleural fluid.    The liver is normal in size and attenuation with no focal hepatic abnormality.  The  gallbladder shows no evidence of stones or pericholecystic fluid.  There is no intra-or extrahepatic biliary ductal dilatation.    The stomach, spleen, pancreas, and adrenal glands are unremarkable.    The kidneys are normal in size and location and concentrate and excrete contrast properly on delayed imaging.  There is no evidence of hydronephrosis.  The ureters appear normal in course and caliber without evidence of ureteral dilatation. The urinary bladder demonstrates no significant abnormality.    Uterus is unremarkable.  Dermoid measuring 3.2 x 2.3 cm is suspected within the left adnexa.  Recommend follow-up pelvic ultrasound.    The abdominal aorta is normal in course and caliber without significant atherosclerotic calcifications.    Small hiatal hernia.  The visualized loops of small bowel show no evidence of obstruction or inflammation. Large bowel demonstrates moderate constipation.  Normal appendix..  There is no ascites, free fluid, or intraperitoneal free air. There is no evidence of lymph node enlargement in the abdomen or pelvis.    When viewed with bone windows the osseous structures are unremarkable.  The extraperitoneal soft tissues are unremarkable.                               CT Head Without Contrast (Final result)  Result time 02/24/19 17:41:23    Final result by Henok Reyes MD (02/24/19 17:41:23)                 Impression:      No acute abnormality.    All CT scans at this facility use dose modulation, iterative reconstruction, and/or weight based dosing when appropriate to reduce radiation dose to as low as reasonable achievable.      Electronically signed by: Henok Reyes MD  Date:    02/24/2019  Time:    17:41             Narrative:    EXAMINATION:  CT HEAD WITHOUT CONTRAST    CLINICAL HISTORY:  Head trauma, headache; Unspecified injury of head, initial encounter    TECHNIQUE:  Low dose axial CT images obtained throughout the head without intravenous contrast. Sagittal and coronal  "reconstructions were performed.    All CT scans at this facility use dose modulation, iterative reconstruction, and/or weight based dosing when appropriate to reduce radiation dose to as low as reasonable achievable.    COMPARISON:  None.    FINDINGS:  Intracranial compartment:    The brain parenchyma appears normal. No parenchymal mass, hemorrhage, edema or major vascular distribution infarct.    Ventricles and sulci are normal in size for age without evidence of hydrocephalus.    No extra-axial blood or fluid collections.    Skull/extracranial contents (limited evaluation): Nasal bone fracture.  Moderate mucosal thickening throughout the paranasal sinuses with polyp retention cysts within the sphenoid sinus and bilateral maxillary sinuses.  Mastoid air cells are clear.                               X-Ray Chest AP Portable (Final result)  Result time 02/24/19 17:17:04    Final result by Henok Reyes MD (02/24/19 17:17:04)                 Impression:      No acute process seen.      Electronically signed by: Henok Reyes MD  Date:    02/24/2019  Time:    17:17             Narrative:    EXAMINATION:  XR CHEST AP PORTABLE    CLINICAL HISTORY:  fall;    FINDINGS:  Single view of the chest.    Cardiac silhouette is normal.  The lungs demonstrate no evidence of active disease.  No evidence of pleural effusion or pneumothorax.  Bones appear intact.                                       Vitals:    02/24/19 1546 02/24/19 1547 02/24/19 1550 02/24/19 1737   BP: (!) 132/91   126/81   Pulse: 107  99 99   Resp: 16   18   Temp: 99.3 °F (37.4 °C) 98.1 °F (36.7 °C)  98.5 °F (36.9 °C)   TempSrc: Oral Oral  Oral   SpO2: 99%   98%   Weight: 76.1 kg (167 lb 14.1 oz)      Height: 5' 4" (1.626 m)       02/24/19 2001 02/24/19 2332 02/25/19 0017   BP: (!) 100/59 (!) 140/82 (!) 139/92   Pulse: 83 89 86   Resp: 17 15 (!) 22   Temp:      TempSrc:      SpO2: 99% 100% 99%   Weight:      Height:          Results for orders placed or " performed during the hospital encounter of 02/24/19   CBC auto differential   Result Value Ref Range    WBC 8.01 3.90 - 12.70 K/uL    RBC 5.03 4.00 - 5.40 M/uL    Hemoglobin 13.9 12.0 - 16.0 g/dL    Hematocrit 43.7 37.0 - 48.5 %    MCV 87 82 - 98 fL    MCH 27.6 27.0 - 31.0 pg    MCHC 31.8 (L) 32.0 - 36.0 g/dL    RDW 14.7 (H) 11.5 - 14.5 %    Platelets 351 (H) 150 - 350 K/uL    MPV 9.7 9.2 - 12.9 fL    Gran # (ANC) 3.2 1.8 - 7.7 K/uL    Lymph # 4.1 1.0 - 4.8 K/uL    Mono # 0.6 0.3 - 1.0 K/uL    Eos # 0.2 0.0 - 0.5 K/uL    Baso # 0.06 0.00 - 0.20 K/uL    Gran% 39.5 38.0 - 73.0 %    Lymph% 50.8 (H) 18.0 - 48.0 %    Mono% 7.0 4.0 - 15.0 %    Eosinophil% 1.9 0.0 - 8.0 %    Basophil% 0.7 0.0 - 1.9 %    Differential Method Automated    Comprehensive metabolic panel   Result Value Ref Range    Sodium 143 136 - 145 mmol/L    Potassium 3.9 3.5 - 5.1 mmol/L    Chloride 104 95 - 110 mmol/L    CO2 23 23 - 29 mmol/L    Glucose 126 (H) 70 - 110 mg/dL    BUN, Bld 6 6 - 20 mg/dL    Creatinine 0.7 0.5 - 1.4 mg/dL    Calcium 9.4 8.7 - 10.5 mg/dL    Total Protein 8.8 (H) 6.0 - 8.4 g/dL    Albumin 4.5 3.5 - 5.2 g/dL    Total Bilirubin 0.3 0.1 - 1.0 mg/dL    Alkaline Phosphatase 87 55 - 135 U/L    AST 30 10 - 40 U/L    ALT 28 10 - 44 U/L    Anion Gap 16 8 - 16 mmol/L    eGFR if African American >60.0 >60 mL/min/1.73 m^2    eGFR if non African American >60.0 >60 mL/min/1.73 m^2   Ethanol   Result Value Ref Range    Alcohol, Medical, Serum 403 (HH) <10 mg/dL   Drug screen panel, emergency   Result Value Ref Range    Benzodiazepines Negative     Methadone metabolites Negative     Cocaine (Metab.) Negative     Opiate Scrn, Ur Negative     Barbiturate Screen, Ur Negative     Amphetamine Screen, Ur Negative     THC Negative     Phencyclidine Negative     Creatinine, Random Ur 14.9 (L) 15.0 - 325.0 mg/dL    Toxicology Information SEE COMMENT    Protime-INR   Result Value Ref Range    Prothrombin Time 9.4 9.0 - 12.5 sec    INR 0.9 0.8 - 1.2    Troponin I #1   Result Value Ref Range    Troponin I <0.006 0.000 - 0.026 ng/mL   B-Type natriuretic peptide (BNP)   Result Value Ref Range    BNP <10 0 - 99 pg/mL   Pregnancy, urine rapid   Result Value Ref Range    Preg Test, Ur Negative    CK   Result Value Ref Range     20 - 180 U/L   Ethanol   Result Value Ref Range    Alcohol, Medical, Serum 249 (H) <10 mg/dL         Imaging Results          CT Cervical Spine Without Contrast (Final result)  Result time 02/24/19 17:47:57    Final result by Henok Reyes MD (02/24/19 17:47:57)                 Impression:      Straightening of the normal cervical lordosis can be seen with patient positioning or muscular spasm.    All CT scans at this facility use dose modulation, iterative reconstruction, and/or weight base dosing when appropriate to reduce radiation dose to as low as reasonably achievable.      Electronically signed by: Henok Reyes MD  Date:    02/24/2019  Time:    17:47             Narrative:    EXAMINATION:  CT CERVICAL SPINE WITHOUT CONTRAST    CLINICAL HISTORY:  Neck pain, first study;    TECHNIQUE:  2.5 mm axial noncontrast CT images were obtained through the cervical spine using soft tissue and bony algorithm so.  Sagittal coronal reformats were also submitted for interpretation.    COMPARISON:  None    FINDINGS:  Straightening of the normal cervical lordosis can be seen with patient positioning or muscular spasm.The vertebral body heights are well-maintained. Intervertebral disc space height is maintained. No fractures are identified. Prevertebral soft tissues are unremarkable.    Please note that routine CT of the spine is limited in its evaluation of extradural/epidural disease.    Shotty adenopathy seen throughout the neck.  Polyp or retention cyst suspected within the sphenoid sinus on the right filling the majority of the sinus.                               CT Maxillofacial Without Contrast (Final result)  Result time 02/24/19  17:57:17    Final result by Henok Reyes MD (02/24/19 17:57:17)                 Impression:      Minimally displaced nasal bone fracture suspected.    Sinus disease.    All CT scans at this facility use dose modulation, iterative reconstruction and/or weight based dosing when appropriate to reduce radiation dose to as low as reasonably achievable.      Electronically signed by: Henok Reyes MD  Date:    02/24/2019  Time:    17:57             Narrative:    EXAMINATION:  CT MAXILLOFACIAL WITHOUT CONTRAST    CLINICAL HISTORY:  Maxface trauma blunt;    TECHNIQUE:  Axial CT images through the facial bones were obtained without contrast.    COMPARISON:  None    FINDINGS:  Minimally displaced nasal bone fracture is suspected.  Remote fracture of the medial left orbital wall..    Orbits are intact.    Polyps or retention cysts within the maxillary and right sphenoid sinus with near complete opacification of the right sphenoid sinus.    Perinasal and perioral soft tissue swelling/thickening.    Shotty adenopathy.    Scattered odontogenic disease is noted.                               CT Chest Abdoment Pelvis With Contrast (Final result)  Result time 02/24/19 18:02:06    Final result by Henok Reyes MD (02/24/19 18:02:06)                 Impression:      No acute posttraumatic abnormalities.    Dermoid measuring 3.2 x 2.3 cm is suspected within the left adnexa. Recommend follow-up pelvic ultrasound    See above for additional findings.    All CT scans at this facility use dose modulation, iterative reconstruction and/or weight based dosing when appropriate to reduce radiation dose to as low as reasonably achievable.      Electronically signed by: Henok Reyes MD  Date:    02/24/2019  Time:    18:02             Narrative:    EXAMINATION:  CT CHEST ABDOMEN PELVIS WITH CONTRAST (XPD)    CLINICAL HISTORY:  Chest-abdomen-pelvis trauma, minor, blunt;    TECHNIQUE:  The patient was surveyed from the thoracic inlet through  the pelvis after the administration of 75 cc Omni 350 IV contrast as well as oral contrast and data was reconstructed for coronal, sagittal, and axial images.    COMPARISON:  12/19/2016    FINDINGS:  The soft tissues at the base of the neck are unremarkable.  1.1 cm nodule within the inferior aspect of left thyroid lobe; recommend follow-up ultrasound.  There is no abnormal lymph node enlargement.  Shotty nodes are seen throughout the neck.    There is no axillary, mediastinal, or hilar lymphadenopathy.  The hilar contours are unremarkable.  The esophagus is normal in course and contour.    The thoracic aorta is normal in course, caliber, and contour without significant atherosclerotic calcifications.The heart does not appear enlarged and there is no pericardial effusion.    The trachea and proximal airways are patent.  The lungs are symmetrically expanded and demonstrate no convincing evidence of consolidation, pleural thickening, pneumothorax, or pleural fluid.    The liver is normal in size and attenuation with no focal hepatic abnormality.  The gallbladder shows no evidence of stones or pericholecystic fluid.  There is no intra-or extrahepatic biliary ductal dilatation.    The stomach, spleen, pancreas, and adrenal glands are unremarkable.    The kidneys are normal in size and location and concentrate and excrete contrast properly on delayed imaging.  There is no evidence of hydronephrosis.  The ureters appear normal in course and caliber without evidence of ureteral dilatation. The urinary bladder demonstrates no significant abnormality.    Uterus is unremarkable.  Dermoid measuring 3.2 x 2.3 cm is suspected within the left adnexa.  Recommend follow-up pelvic ultrasound.    The abdominal aorta is normal in course and caliber without significant atherosclerotic calcifications.    Small hiatal hernia.  The visualized loops of small bowel show no evidence of obstruction or inflammation. Large bowel demonstrates  moderate constipation.  Normal appendix..  There is no ascites, free fluid, or intraperitoneal free air. There is no evidence of lymph node enlargement in the abdomen or pelvis.    When viewed with bone windows the osseous structures are unremarkable.  The extraperitoneal soft tissues are unremarkable.                               CT Head Without Contrast (Final result)  Result time 02/24/19 17:41:23    Final result by Henok Reyes MD (02/24/19 17:41:23)                 Impression:      No acute abnormality.    All CT scans at this facility use dose modulation, iterative reconstruction, and/or weight based dosing when appropriate to reduce radiation dose to as low as reasonable achievable.      Electronically signed by: Henok Reyes MD  Date:    02/24/2019  Time:    17:41             Narrative:    EXAMINATION:  CT HEAD WITHOUT CONTRAST    CLINICAL HISTORY:  Head trauma, headache; Unspecified injury of head, initial encounter    TECHNIQUE:  Low dose axial CT images obtained throughout the head without intravenous contrast. Sagittal and coronal reconstructions were performed.    All CT scans at this facility use dose modulation, iterative reconstruction, and/or weight based dosing when appropriate to reduce radiation dose to as low as reasonable achievable.    COMPARISON:  None.    FINDINGS:  Intracranial compartment:    The brain parenchyma appears normal. No parenchymal mass, hemorrhage, edema or major vascular distribution infarct.    Ventricles and sulci are normal in size for age without evidence of hydrocephalus.    No extra-axial blood or fluid collections.    Skull/extracranial contents (limited evaluation): Nasal bone fracture.  Moderate mucosal thickening throughout the paranasal sinuses with polyp retention cysts within the sphenoid sinus and bilateral maxillary sinuses.  Mastoid air cells are clear.                               X-Ray Chest AP Portable (Final result)  Result time 02/24/19 17:17:04     Final result by Henok Reyes MD (02/24/19 17:17:04)                 Impression:      No acute process seen.      Electronically signed by: Henok Reyes MD  Date:    02/24/2019  Time:    17:17             Narrative:    EXAMINATION:  XR CHEST AP PORTABLE    CLINICAL HISTORY:  fall;    FINDINGS:  Single view of the chest.    Cardiac silhouette is normal.  The lungs demonstrate no evidence of active disease.  No evidence of pleural effusion or pneumothorax.  Bones appear intact.                                Medications   sodium chloride 0.9% bolus 1,000 mL (0 mLs Intravenous Stopped 2/24/19 1730)   Tdap vaccine injection 0.5 mL (0.5 mLs Intramuscular Given 2/24/19 1737)   omnipaque 350 iohexol 75 mL (75 mLs Intravenous Given 2/24/19 1744)   sodium chloride 0.9% bolus 1,000 mL (0 mLs Intravenous Stopped 2/24/19 2032)         5:10 PM  - Re-evaluation:  The patient is resting comfortably and is in no acute distress. Discussed test results and notified of pending labs. Answered questions at this time.     6:00 PM - Transfer of Care: Patient care transferred from Dr. Woo to Dr. Belle, pending ct results.      All findings were reviewed with the patient/family in detail.  She is now finally awake and fully coherent.  Speech and gait are steady.  She will be released into the care of her family.  I see no indication of an emergent process beyond that addressed during our encounter but have duly counseled the patient/family regarding the need for prompt follow-up as well as the indications that should prompt immediate return to the emergency room should new or worrisome developments occur.  The patient/family communicates understanding of all this information and all remaining questions and concerns were addressed at this time.        Pre-hypertension/Hypertension: The pt has been informed that they may have pre-hypertension or hypertension based on a blood pressure reading in the ED. I recommend that the pt call  the PCP listed on their discharge instructions or a physician of their choice this week to arrange f/u for further evaluation of possible pre-hypertension or hypertension.     Patricia Melendez was given a handout which discussed their disease process, precautions, and instructions for follow-up and therapy.    Follow-up Information     Kelvin Brothers MD. Schedule an appointment as soon as possible for a visit in 1 day.    Specialty:  Family Medicine  Why:  for reassessment  Contact information:  [address] [facility] MEDIC[facility]  [facility] [address]  [phone]                      Medication List      ASK your doctor about these medications    terbinafine HCl 1 % cream  Commonly known as:  LAMISIL  Apply topically to affected area twice daily.           Current Discharge Medication List            ED Diagnosis  1. Laceration of left eyebrow, initial encounter    2. Head trauma    3. Fall    4. Alcoholic intoxication without complication    5. Strain of neck muscle, initial encounter                          Clinical Impression:       ICD-10-CM ICD-9-CM   1. Laceration of left eyebrow, initial encounter S01.112A 873.42   2. Head trauma S09.90XA 959.01   3. Fall W19.XXXA E888.9   4. Alcoholic intoxication without complication F10.920 305.00   5. Strain of neck muscle, initial encounter S16.1XXA 847.0         Disposition:   Condition: Stable                        Tim Belle MD  02/26/19 0225

## 2019-02-25 VITALS
OXYGEN SATURATION: 99 % | HEART RATE: 86 BPM | DIASTOLIC BLOOD PRESSURE: 92 MMHG | HEIGHT: 64 IN | SYSTOLIC BLOOD PRESSURE: 139 MMHG | TEMPERATURE: 99 F | BODY MASS INDEX: 28.66 KG/M2 | RESPIRATION RATE: 22 BRPM | WEIGHT: 167.88 LBS

## 2019-02-25 NOTE — ED NOTES
Pt is resting with eyes closed, awakens to verbal stimuli, normal sinus rhythm on cardiac monitor, resp e/u, nad. Will continue to monitor.

## 2019-02-25 NOTE — ED NOTES
The patient is resting quietly, eyes closed, arouses easily to stimuli. Airway is open and patent, respirations are spontaneous, normal respiratory effort and rate noted, skin warm and dry, appearance: in no acute distress and resting comfortably. Call bell within reach. Bed lock in low position.

## 2019-02-25 NOTE — ED NOTES
Spoke to patient's grandmother Mrs. Lewis. Patient's grandmother states she is on her way to come  patient from ER.

## 2019-02-25 NOTE — ED NOTES
The patient is resting quietly, eyes closed, arouses easily to stimuli. Airway is open and patent, respirations are spontaneous, normal respiratory effort and rate noted, skin warm and dry, appearance: in no acute distress and resting comfortably.Cardiac monitoring in place. IV access patent w/ IVF administering. Bed lock in low position. Side rails up x2. Call bell within reach.

## 2019-09-07 ENCOUNTER — HOSPITAL ENCOUNTER (EMERGENCY)
Facility: HOSPITAL | Age: 29
Discharge: HOME | End: 2019-09-08
Attending: EMERGENCY MEDICINE
Payer: MEDICAID

## 2019-09-07 DIAGNOSIS — Z72.0 TOBACCO ABUSE: ICD-10-CM

## 2019-09-07 DIAGNOSIS — F10.920 ACUTE ALCOHOLIC INTOXICATION WITHOUT COMPLICATION: Primary | ICD-10-CM

## 2019-09-07 LAB
ALBUMIN SERPL BCP-MCNC: 4.2 G/DL (ref 3.5–5.2)
ALP SERPL-CCNC: 72 U/L (ref 55–135)
ALT SERPL W/O P-5'-P-CCNC: 14 U/L (ref 10–44)
AMPHET+METHAMPHET UR QL: NEGATIVE
ANION GAP SERPL CALC-SCNC: 12 MMOL/L (ref 8–16)
AST SERPL-CCNC: 15 U/L (ref 10–40)
B-HCG UR QL: NEGATIVE
BARBITURATES UR QL SCN>200 NG/ML: NEGATIVE
BASOPHILS # BLD AUTO: 0.05 K/UL (ref 0–0.2)
BASOPHILS NFR BLD: 0.6 % (ref 0–1.9)
BENZODIAZ UR QL SCN>200 NG/ML: NEGATIVE
BILIRUB SERPL-MCNC: 0.2 MG/DL (ref 0.1–1)
BILIRUB UR QL STRIP: NEGATIVE
BUN SERPL-MCNC: 8 MG/DL (ref 6–20)
BZE UR QL SCN: NEGATIVE
CALCIUM SERPL-MCNC: 9.1 MG/DL (ref 8.7–10.5)
CANNABINOIDS UR QL SCN: NEGATIVE
CHLORIDE SERPL-SCNC: 110 MMOL/L (ref 95–110)
CLARITY UR REFRACT.AUTO: CLEAR
CO2 SERPL-SCNC: 22 MMOL/L (ref 23–29)
COLOR UR AUTO: YELLOW
CREAT SERPL-MCNC: 0.8 MG/DL (ref 0.5–1.4)
CREAT UR-MCNC: 64.6 MG/DL (ref 15–325)
DIFFERENTIAL METHOD: ABNORMAL
EOSINOPHIL # BLD AUTO: 0.1 K/UL (ref 0–0.5)
EOSINOPHIL NFR BLD: 1 % (ref 0–8)
ERYTHROCYTE [DISTWIDTH] IN BLOOD BY AUTOMATED COUNT: 14 % (ref 11.5–14.5)
EST. GFR  (AFRICAN AMERICAN): >60 ML/MIN/1.73 M^2
EST. GFR  (NON AFRICAN AMERICAN): >60 ML/MIN/1.73 M^2
GLUCOSE SERPL-MCNC: 102 MG/DL (ref 70–110)
GLUCOSE UR QL STRIP: NEGATIVE
HCT VFR BLD AUTO: 42.8 % (ref 37–48.5)
HGB BLD-MCNC: 13.9 G/DL (ref 12–16)
HGB UR QL STRIP: ABNORMAL
KETONES UR QL STRIP: NEGATIVE
LEUKOCYTE ESTERASE UR QL STRIP: NEGATIVE
LYMPHOCYTES # BLD AUTO: 4 K/UL (ref 1–4.8)
LYMPHOCYTES NFR BLD: 49.9 % (ref 18–48)
MCH RBC QN AUTO: 28 PG (ref 27–31)
MCHC RBC AUTO-ENTMCNC: 32.5 G/DL (ref 32–36)
MCV RBC AUTO: 86 FL (ref 82–98)
METHADONE UR QL SCN>300 NG/ML: NEGATIVE
MONOCYTES # BLD AUTO: 0.5 K/UL (ref 0.3–1)
MONOCYTES NFR BLD: 6.7 % (ref 4–15)
NEUTROPHILS # BLD AUTO: 3.3 K/UL (ref 1.8–7.7)
NEUTROPHILS NFR BLD: 41.8 % (ref 38–73)
NITRITE UR QL STRIP: NEGATIVE
OPIATES UR QL SCN: NEGATIVE
PCP UR QL SCN>25 NG/ML: NEGATIVE
PH UR STRIP: 5 [PH] (ref 5–8)
PLATELET # BLD AUTO: 337 K/UL (ref 150–350)
PMV BLD AUTO: 10.1 FL (ref 9.2–12.9)
POTASSIUM SERPL-SCNC: 4 MMOL/L (ref 3.5–5.1)
PROT SERPL-MCNC: 8 G/DL (ref 6–8.4)
PROT UR QL STRIP: NEGATIVE
RBC # BLD AUTO: 4.96 M/UL (ref 4–5.4)
SODIUM SERPL-SCNC: 144 MMOL/L (ref 136–145)
SP GR UR STRIP: 1.01 (ref 1–1.03)
TOXICOLOGY INFORMATION: NORMAL
URN SPEC COLLECT METH UR: ABNORMAL
UROBILINOGEN UR STRIP-ACNC: NEGATIVE EU/DL
WBC # BLD AUTO: 7.91 K/UL (ref 3.9–12.7)

## 2019-09-07 PROCEDURE — 81025 URINE PREGNANCY TEST: CPT | Mod: ER

## 2019-09-07 PROCEDURE — 96360 HYDRATION IV INFUSION INIT: CPT | Mod: ER

## 2019-09-07 PROCEDURE — 80307 DRUG TEST PRSMV CHEM ANLYZR: CPT | Mod: ER

## 2019-09-07 PROCEDURE — 81003 URINALYSIS AUTO W/O SCOPE: CPT | Mod: 59,ER

## 2019-09-07 PROCEDURE — 96360 HYDRATION IV INFUSION INIT: CPT | Mod: 59,ER

## 2019-09-07 PROCEDURE — 80320 DRUG SCREEN QUANTALCOHOLS: CPT | Mod: ER

## 2019-09-07 PROCEDURE — 85025 COMPLETE CBC W/AUTO DIFF WBC: CPT | Mod: ER

## 2019-09-07 PROCEDURE — 99284 EMERGENCY DEPT VISIT MOD MDM: CPT | Mod: 25,ER

## 2019-09-07 PROCEDURE — 80053 COMPREHEN METABOLIC PANEL: CPT | Mod: ER

## 2019-09-07 RX ADMIN — SODIUM CHLORIDE 1000 ML: 0.9 INJECTION, SOLUTION INTRAVENOUS at 11:09

## 2019-09-08 VITALS
BODY MASS INDEX: 27.25 KG/M2 | DIASTOLIC BLOOD PRESSURE: 53 MMHG | SYSTOLIC BLOOD PRESSURE: 92 MMHG | TEMPERATURE: 98 F | WEIGHT: 158.75 LBS | HEART RATE: 88 BPM | OXYGEN SATURATION: 92 % | RESPIRATION RATE: 17 BRPM

## 2019-09-08 LAB
ETHANOL SERPL-MCNC: 278 MG/DL
ETHANOL SERPL-MCNC: 389 MG/DL

## 2019-09-08 PROCEDURE — 96361 HYDRATE IV INFUSION ADD-ON: CPT | Mod: ER

## 2019-09-08 PROCEDURE — 63600175 PHARM REV CODE 636 W HCPCS: Mod: ER | Performed by: EMERGENCY MEDICINE

## 2019-09-08 PROCEDURE — 80320 DRUG SCREEN QUANTALCOHOLS: CPT | Mod: ER

## 2019-09-08 RX ORDER — SODIUM CHLORIDE 9 MG/ML
1000 INJECTION, SOLUTION INTRAVENOUS
Status: COMPLETED | OUTPATIENT
Start: 2019-09-08 | End: 2019-09-08

## 2019-09-08 RX ADMIN — SODIUM CHLORIDE 1000 ML: 0.9 INJECTION, SOLUTION INTRAVENOUS at 02:09

## 2019-09-08 RX ADMIN — SODIUM CHLORIDE 1000 ML: 0.9 INJECTION, SOLUTION INTRAVENOUS at 12:09
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
## 2020-06-16 ENCOUNTER — HOSPITAL ENCOUNTER (EMERGENCY)
Facility: HOSPITAL | Age: 30
Discharge: HOME | End: 2020-06-16
Attending: EMERGENCY MEDICINE
Payer: MEDICAID

## 2020-06-16 VITALS
OXYGEN SATURATION: 98 % | BODY MASS INDEX: 26.72 KG/M2 | TEMPERATURE: 99 F | HEART RATE: 88 BPM | WEIGHT: 155.63 LBS | SYSTOLIC BLOOD PRESSURE: 137 MMHG | DIASTOLIC BLOOD PRESSURE: 95 MMHG | RESPIRATION RATE: 16 BRPM
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
## 2020-06-16 DIAGNOSIS — S93.401A SPRAIN OF RIGHT ANKLE, UNSPECIFIED LIGAMENT, INITIAL ENCOUNTER: Primary | ICD-10-CM

## 2020-06-16 DIAGNOSIS — R03.0 ELEVATED BLOOD PRESSURE READING: ICD-10-CM

## 2020-06-16 PROCEDURE — 99283 EMERGENCY DEPT VISIT LOW MDM: CPT | Mod: 25,ER

## 2020-06-16 RX ORDER — IBUPROFEN 400 MG/1
400 TABLET ORAL EVERY 6 HOURS PRN
Qty: 15 TABLET | Refills: 0 | Status: SHIPPED | OUTPATIENT
Start: 2020-06-16 | End: 2023-11-11 | Stop reason: SDUPTHER

## 2020-06-16 NOTE — ED PROVIDER NOTES
"   History      Chief Complaint   Patient presents with    Foot Pain     Pt states she "twisted" her right ankle last night. C/o right ankle/foot pain       Review of patient's allergies indicates:  No Known Allergies     HPI   HPI    6/16/2020, 1:58 PM   History obtained from the patient      History of Present Illness: Patricia Melendez is a 30 y.o. female patient who presents to the Emergency Department for right ankle pain since twisting it last night. Able to weight bear immediately after and now with limp.  Denies knee pain or injury elsewhere.  Symptoms are constant and moderate in severity.     No further complaints or concerns at this time.     Blood pressure is elevated.  Pt denies cp, sob, ha, dizziness, vision change.          PCP: Kelvin Brothers MD       Past Medical History:  Past Medical History:   Diagnosis Date    Asthma     Hypertension          Past Surgical History:  Past Surgical History:   Procedure Laterality Date    NO PAST SURGERIES             Family History:  Family History   Problem Relation Age of Onset    Hypertension Mother     Diabetes Maternal Grandmother            Social History:  Social History     Tobacco Use    Smoking status: Current Every Day Smoker     Packs/day: 0.50     Types: Cigarettes    Smokeless tobacco: Never Used   Substance and Sexual Activity    Alcohol use: Yes    Drug use: Yes    Sexual activity: Yes     Partners: Male     Birth control/protection: None       ROS   Review of Systems   Constitutional: Negative for chills and fever.   HENT: Negative for facial swelling and sore throat.    Eyes: Negative for pain and visual disturbance.   Respiratory: Negative for chest tightness and shortness of breath.    Cardiovascular: Negative for chest pain and palpitations.   Gastrointestinal: Negative for abdominal distention and abdominal pain.   Endocrine: Negative for cold intolerance and heat intolerance.   Genitourinary: Negative for dysuria and hematuria. "   Musculoskeletal: Negative for neck pain and neck stiffness.  Positive for ankle pain  Skin: Negative for color change and pallor.   Neurological: Negative for syncope and weakness.   Hematological: Negative for adenopathy. Does not bruise/bleed easily.   All other systems reviewed and are negative.    Review of Systems    Physical Exam      Initial Vitals [06/16/20 1301]   BP Pulse Resp Temp SpO2   (!) 144/111 93 16 98.8 °F (37.1 °C) 98 %      MAP       --         Physical Exam  Vital signs and nursing notes reviewed.  Constitutional: Patient is in NAD. Awake and alert. Well-developed and well-nourished.  Head: Atraumatic. Normocephalic.  Eyes: PERRL. EOM intact. Conjunctivae nl. No scleral icterus.  ENT: Mucous membranes are moist. Oropharynx is clear.  Neck: Supple. No JVD. No lymphadenopathy.  No meningismus  Cardiovascular: Regular rate and rhythm. No murmurs, rubs, or gallops. Distal pulses are 2+ and symmetric.  Pulmonary/Chest: No respiratory distress. Clear to auscultation bilaterally. No wheezing, rales, or rhonchi.  Abdominal: Soft. Non-distended. No TTP. No rebound, guarding, or rigidity. Good bowel sounds.  Genitourinary: No CVA tenderness  Musculoskeletal: Moves all extremities.  Right ankle with mild ttp to lateral malleolus.   Nontender medial malleolus, tarsals and base of 5th metatarsal.  Knee nontender with from.  No proximal tibial ttp.  2+ DP.  Normal sensation to toes x 5.  Skin: Warm and dry.  Neurological: Awake and alert. No acute focal neurological deficits are appreciated.  Psychiatric: Normal affect. Good eye contact. Appropriate in content.      ED Course          Splint Application    Date/Time: 6/16/2020 8:56 PM  Performed by: Ayala Langston PA-C  Authorized by: Jh Falcon MD   Location details: right ankle  Supplies used: elastic bandage  Post-procedure: The splinted body part was neurovascularly unchanged following the procedure.  Patient tolerance: Patient tolerated the  procedure well with no immediate complications        ED Vital Signs:  Vitals:    06/16/20 1301 06/16/20 1356   BP: (!) 144/111 (!) 137/95   Pulse: 93 88   Resp: 16 16   Temp: 98.8 °F (37.1 °C) 98.5 °F (36.9 °C)   TempSrc: Oral Oral   SpO2: 98% 98%   Weight: 70.6 kg (155 lb 10.3 oz)                  Imaging Results:  Imaging Results          X-Ray Ankle Complete Right (Final result)  Result time 06/16/20 13:42:37    Final result by Adonay Geronimo MD (06/16/20 13:42:37)                 Impression:      No abnormality identified.      Electronically signed by: Adonay Geronimo  Date:    06/16/2020  Time:    13:42             Narrative:    EXAMINATION:  XR ANKLE COMPLETE 3 VIEW RIGHT    CLINICAL HISTORY:  Fall on same level from slipping, tripping and stumbling without subsequent striking against object, initial encounter    TECHNIQUE:  AP, lateral, and oblique images of the right ankle were performed.    COMPARISON:  None    FINDINGS:  No evidence of fracture or dislocation.  Soft tissues unremarkable.  No evidence of foreign body.  Ankle mortise appears congruent.  No appreciable tibiotalar joint effusion.                               X-Ray Foot Complete Right (Final result)  Result time 06/16/20 13:44:44    Final result by Adonay Geronimo MD (06/16/20 13:44:44)                 Impression:      No abnormality identified.      Electronically signed by: Adonay Geronimo  Date:    06/16/2020  Time:    13:44             Narrative:    EXAMINATION:  XR FOOT COMPLETE 3 VIEW RIGHT    CLINICAL HISTORY:  .  Fall on same level from slipping, tripping and stumbling without subsequent striking against object, initial encounter    TECHNIQUE:  AP, lateral and oblique views of the left foot were performed.    COMPARISON:  None    FINDINGS:  No evidence of fracture or dislocation.  Soft tissues unremarkable.  No radiopaque foreign body.  Joint spaces appear well maintained.                                   The Emergency Provider reviewed  the vital signs and test results, which are outlined above.    ED Discussion       All findings were reviewed with the patient/family in detail.   All remaining questions and concerns were addressed at that time.  Patient/family has been counseled regarding the need for follow-up as well as the indication to return to the emergency room should new or worrisome developments occur.      Medication(s) given in the ER:  Medications - No data to display        Follow-up Information     Kelvin Brothers MD In 3 days.    Specialty: Family Medicine  Contact information:  [address] [facility]  [place] [id number] [phone]                          Medication List      START taking these medications    ibuprofen 400 MG tablet  Commonly known as: ADVIL,MOTRIN  Take 1 tablet (400 mg total) by mouth every 6 (six) hours as needed for Other (pain or fever).           Where to Get Your Medications      These medications were sent to [facility]/pharm[facility] #[id number] - [place], [place] - [address] [facility] [place]  [address] [facility] [place] [address]    Phone: [phone]   · ibuprofen 400 MG tablet             Medical Decision Making        MDM               Clinical Impression:        ICD-10-CM ICD-9-CM   1. Sprain of right ankle, unspecified ligament, initial encounter  S93.401A 845.00   2. Fall from slip, trip, or stumble, initial encounter  W01.0XXA E885.9   3. Elevated blood pressure reading  R03.0 796.2               Ayala Langston PA-C  06/16/20 8915

## 2021-03-22 NOTE — ED NOTES
Pt speaking softly at times - continues to report pain to throat. No change in patient status.    (0) indicator not present

## 2022-10-11 NOTE — ED NOTES
Laceration to left eyebrow dry, intact, no drainage noted. Pt states change in vision in left eye due to swelling. Pt unable to fully open left eye.    I have personally seen and examined the patient. I have collaborated with and supervised the

## 2022-12-16 ENCOUNTER — HOSPITAL ENCOUNTER (EMERGENCY)
Facility: HOSPITAL | Age: 32
Discharge: HOME | End: 2022-12-16
Attending: EMERGENCY MEDICINE
Payer: MEDICAID

## 2022-12-16 VITALS
DIASTOLIC BLOOD PRESSURE: 95 MMHG | HEIGHT: 64 IN | OXYGEN SATURATION: 96 % | WEIGHT: 178.56 LBS | HEART RATE: 87 BPM | SYSTOLIC BLOOD PRESSURE: 139 MMHG | RESPIRATION RATE: 16 BRPM | TEMPERATURE: 98 F | BODY MASS INDEX: 30.48 KG/M2

## 2022-12-16 DIAGNOSIS — S69.91XA INJURY OF FINGER OF RIGHT HAND, INITIAL ENCOUNTER: Primary | ICD-10-CM

## 2022-12-16 PROCEDURE — 99283 EMERGENCY DEPT VISIT LOW MDM: CPT | Mod: ER

## 2022-12-16 NOTE — Clinical Note
"Patricia RIGGS" Nora was seen and treated in our emergency department on 12/16/2022.  She may return to work on 12/17/2022.       If you have any questions or concerns, please don't hesitate to call.      Eldon Cheema NP"

## 2022-12-18 NOTE — ED PROVIDER NOTES
Encounter Date: 12/16/2022       History     Chief Complaint   Patient presents with    Finger Injury     Pt fell last night on to right hand. Right 5th finger pain.      Patient states she fell yesterday and is having pain to her right 5th finger in the PIP    The history is provided by the patient.   General Illness   Pertinent negatives include no fever, no nausea, no sore throat, no shortness of breath and no rash. She has received no recent medical care.   Review of patient's allergies indicates:  No Known Allergies  Past Medical History:   Diagnosis Date    ADHD (attention deficit hyperactivity disorder)     Asthma     Hypertension      Past Surgical History:   Procedure Laterality Date    NO PAST SURGERIES       Family History   Problem Relation Age of Onset    Hypertension Mother     Diabetes Maternal Grandmother      Social History     Tobacco Use    Smoking status: Every Day     Packs/day: 0.50     Types: Cigarettes    Smokeless tobacco: Never   Substance Use Topics    Alcohol use: Yes    Drug use: Not Currently     Review of Systems   Constitutional:  Negative for fever.   HENT:  Negative for sore throat.    Respiratory:  Negative for shortness of breath.    Cardiovascular:  Negative for chest pain.   Gastrointestinal:  Negative for nausea.   Genitourinary:  Negative for dysuria.   Musculoskeletal:  Negative for back pain.   Skin:  Negative for rash.   Neurological:  Negative for weakness.   Hematological:  Does not bruise/bleed easily.     Physical Exam     Initial Vitals [12/16/22 1357]   BP Pulse Resp Temp SpO2   (!) 139/95 87 16 97.5 °F (36.4 °C) 96 %      MAP       --         Physical Exam    Nursing note and vitals reviewed.  Constitutional: She appears well-developed and well-nourished. She is not diaphoretic. She is active.  Non-toxic appearance. No distress.   HENT:   Head: Normocephalic and atraumatic.   Eyes: Conjunctivae are normal. Right eye exhibits no discharge. Left eye exhibits no  discharge. No scleral icterus.   Neck:   Normal range of motion.  Cardiovascular:  Normal rate, regular rhythm and intact distal pulses.           No murmur heard.  Pulmonary/Chest: Breath sounds normal. No respiratory distress. She has no wheezes.   Abdominal: She exhibits no distension.   Musculoskeletal:         General: No tenderness. Normal range of motion.      Cervical back: Normal range of motion.      Comments: Right 5th finger minimal if no swelling erythema tenderness with range of motion in the PIP     Neurological: She is alert and oriented to person, place, and time. No cranial nerve deficit. GCS score is 15. GCS eye subscore is 4. GCS verbal subscore is 5. GCS motor subscore is 6.   Skin: Skin is warm and dry. Capillary refill takes less than 2 seconds. No rash noted.   Psychiatric: She has a normal mood and affect. Her behavior is normal. Judgment and thought content normal.       ED Course   Procedures  Labs Reviewed - No data to display       Imaging Results              X-Ray Finger 2 or More Views Right (Final result)  Result time 12/16/22 14:22:36      Final result by Henok Reyes MD (12/16/22 14:22:36)                   Impression:      No acute fracture or dislocation.      Electronically signed by: Henok Reyes MD  Date:    12/16/2022  Time:    14:22               Narrative:    EXAMINATION:  XR FINGER 2 OR MORE VIEWS RIGHT    CLINICAL HISTORY:  XR FINGER 2 OR MORE VIEWS RIGHT    COMPARISON:  012/6/16    FINDINGS:  Three views of the right 5th digit were obtained.    No evidence of acute fracture or dislocation.  Bony mineralization is normal.  Soft tissues are unremarkable.                                       Medications - No data to display  Medical Decision Making:   Jeffrey tape applied to finger.  Patient advised to follow up with her primary care or orthopedic if this becomes worse                 I discussed with patient and/or family/caretaker that evaluation in the ED does not  suggest any emergent or life threatening medical conditions requiring immediate intervention beyond what was provided in the ED, and I believe patient is safe for discharge.  Regardless, an unremarkable evaluation in the ED does not preclude the development or presence of a serious of life threatening condition. As such, patient was instructed to return immediately for any worsening or change in current symptoms.         Clinical Impression:   Final diagnoses:  [S69.91XA] Injury of finger of right hand, initial encounter (Primary)        ED Disposition Condition    Discharge Stable          ED Prescriptions    None       Follow-up Information       Follow up With Specialties Details Why Contact Info    Kelvin Brothers MD Family Medicine Schedule an appointment as soon as possible for a visit  As needed [id number] [facility]  [facility] [address]  [phone]               Eldon Cheema NP  12/18/22 1111

## 2022-12-23 ENCOUNTER — HOSPITAL ENCOUNTER (EMERGENCY)
Facility: HOSPITAL | Age: 32
Discharge: HOME | End: 2022-12-23
Attending: EMERGENCY MEDICINE
Payer: MEDICAID

## 2022-12-23 VITALS
HEIGHT: 64 IN | TEMPERATURE: 99 F | DIASTOLIC BLOOD PRESSURE: 76 MMHG | WEIGHT: 165 LBS | SYSTOLIC BLOOD PRESSURE: 117 MMHG | HEART RATE: 100 BPM | OXYGEN SATURATION: 95 % | RESPIRATION RATE: 18 BRPM | BODY MASS INDEX: 28.17 KG/M2

## 2022-12-23 DIAGNOSIS — F10.920 ALCOHOLIC INTOXICATION WITHOUT COMPLICATION: Primary | ICD-10-CM

## 2022-12-23 LAB
ALBUMIN SERPL BCP-MCNC: 4.1 G/DL (ref 3.5–5.2)
ALP SERPL-CCNC: 65 U/L (ref 55–135)
ALT SERPL W/O P-5'-P-CCNC: 18 U/L (ref 10–44)
ANION GAP SERPL CALC-SCNC: 17 MMOL/L (ref 8–16)
APAP SERPL-MCNC: <3 UG/ML (ref 10–20)
APAP SERPL-MCNC: <3 UG/ML (ref 10–20)
AST SERPL-CCNC: 17 U/L (ref 10–40)
BASOPHILS # BLD AUTO: 0.09 K/UL (ref 0–0.2)
BASOPHILS NFR BLD: 1 % (ref 0–1.9)
BILIRUB SERPL-MCNC: 0.1 MG/DL (ref 0.1–1)
BUN SERPL-MCNC: 10 MG/DL (ref 6–20)
CALCIUM SERPL-MCNC: 8.9 MG/DL (ref 8.7–10.5)
CHLORIDE SERPL-SCNC: 106 MMOL/L (ref 95–110)
CO2 SERPL-SCNC: 21 MMOL/L (ref 23–29)
CREAT SERPL-MCNC: 0.7 MG/DL (ref 0.5–1.4)
DIFFERENTIAL METHOD: NORMAL
EOSINOPHIL # BLD AUTO: 0.1 K/UL (ref 0–0.5)
EOSINOPHIL NFR BLD: 1 % (ref 0–8)
ERYTHROCYTE [DISTWIDTH] IN BLOOD BY AUTOMATED COUNT: 13.2 % (ref 11.5–14.5)
EST. GFR  (NO RACE VARIABLE): >60 ML/MIN/1.73 M^2
ETHANOL SERPL-MCNC: 301 MG/DL
GLUCOSE SERPL-MCNC: 92 MG/DL (ref 70–110)
HCT VFR BLD AUTO: 42.4 % (ref 37–48.5)
HGB BLD-MCNC: 13.7 G/DL (ref 12–16)
IMM GRANULOCYTES # BLD AUTO: 0.04 K/UL (ref 0–0.04)
IMM GRANULOCYTES NFR BLD AUTO: 0.5 % (ref 0–0.5)
LIPASE SERPL-CCNC: 48 U/L (ref 4–60)
LYMPHOCYTES # BLD AUTO: 4 K/UL (ref 1–4.8)
LYMPHOCYTES NFR BLD: 46.1 % (ref 18–48)
MCH RBC QN AUTO: 28.2 PG (ref 27–31)
MCHC RBC AUTO-ENTMCNC: 32.3 G/DL (ref 32–36)
MCV RBC AUTO: 87 FL (ref 82–98)
MONOCYTES # BLD AUTO: 0.7 K/UL (ref 0.3–1)
MONOCYTES NFR BLD: 7.8 % (ref 4–15)
NEUTROPHILS # BLD AUTO: 3.8 K/UL (ref 1.8–7.7)
NEUTROPHILS NFR BLD: 43.6 % (ref 38–73)
NRBC BLD-RTO: 0 /100 WBC
PLATELET # BLD AUTO: 366 K/UL (ref 150–450)
PMV BLD AUTO: 10.1 FL (ref 9.2–12.9)
POTASSIUM SERPL-SCNC: 3.7 MMOL/L (ref 3.5–5.1)
PROT SERPL-MCNC: 8.2 G/DL (ref 6–8.4)
RBC # BLD AUTO: 4.86 M/UL (ref 4–5.4)
SALICYLATES SERPL-MCNC: 15.6 MG/DL (ref 15–30)
SALICYLATES SERPL-MCNC: 26.3 MG/DL (ref 15–30)
SODIUM SERPL-SCNC: 144 MMOL/L (ref 136–145)
WBC # BLD AUTO: 8.76 K/UL (ref 3.9–12.7)

## 2022-12-23 PROCEDURE — 82077 ASSAY SPEC XCP UR&BREATH IA: CPT | Mod: ER | Performed by: EMERGENCY MEDICINE

## 2022-12-23 PROCEDURE — 96361 HYDRATE IV INFUSION ADD-ON: CPT | Mod: ER

## 2022-12-23 PROCEDURE — 80143 DRUG ASSAY ACETAMINOPHEN: CPT | Mod: 91,ER | Performed by: EMERGENCY MEDICINE

## 2022-12-23 PROCEDURE — 96375 TX/PRO/DX INJ NEW DRUG ADDON: CPT | Mod: ER

## 2022-12-23 PROCEDURE — 80143 DRUG ASSAY ACETAMINOPHEN: CPT | Mod: ER | Performed by: EMERGENCY MEDICINE

## 2022-12-23 PROCEDURE — 63600175 PHARM REV CODE 636 W HCPCS: Mod: ER | Performed by: EMERGENCY MEDICINE

## 2022-12-23 PROCEDURE — 99284 EMERGENCY DEPT VISIT MOD MDM: CPT | Mod: 25,ER

## 2022-12-23 PROCEDURE — 85025 COMPLETE CBC W/AUTO DIFF WBC: CPT | Mod: ER | Performed by: EMERGENCY MEDICINE

## 2022-12-23 PROCEDURE — 80053 COMPREHEN METABOLIC PANEL: CPT | Mod: ER | Performed by: EMERGENCY MEDICINE

## 2022-12-23 PROCEDURE — 80179 DRUG ASSAY SALICYLATE: CPT | Mod: 91,ER | Performed by: EMERGENCY MEDICINE

## 2022-12-23 PROCEDURE — 96374 THER/PROPH/DIAG INJ IV PUSH: CPT | Mod: ER

## 2022-12-23 PROCEDURE — 83690 ASSAY OF LIPASE: CPT | Mod: ER | Performed by: EMERGENCY MEDICINE

## 2022-12-23 PROCEDURE — 80179 DRUG ASSAY SALICYLATE: CPT | Mod: ER | Performed by: EMERGENCY MEDICINE

## 2022-12-23 RX ORDER — PROCHLORPERAZINE EDISYLATE 5 MG/ML
5 INJECTION INTRAMUSCULAR; INTRAVENOUS
Status: COMPLETED | OUTPATIENT
Start: 2022-12-23 | End: 2022-12-23

## 2022-12-23 RX ORDER — DIPHENHYDRAMINE HYDROCHLORIDE 50 MG/ML
12.5 INJECTION INTRAMUSCULAR; INTRAVENOUS
Status: COMPLETED | OUTPATIENT
Start: 2022-12-23 | End: 2022-12-23

## 2022-12-23 RX ADMIN — SODIUM CHLORIDE, POTASSIUM CHLORIDE, SODIUM LACTATE AND CALCIUM CHLORIDE 1000 ML: 600; 310; 30; 20 INJECTION, SOLUTION INTRAVENOUS at 04:12

## 2022-12-23 RX ADMIN — PROCHLORPERAZINE EDISYLATE 5 MG: 5 INJECTION INTRAMUSCULAR; INTRAVENOUS at 04:12

## 2022-12-23 RX ADMIN — DIPHENHYDRAMINE HYDROCHLORIDE 12.5 MG: 50 INJECTION INTRAMUSCULAR; INTRAVENOUS at 04:12

## 2022-12-23 NOTE — ED PROVIDER NOTES
Encounter Date: 12/23/2022       History     Chief Complaint   Patient presents with    Abdominal Pain     Arrived via [facility] c/o abd pain with n/v etoh+, admits to taking multiple BC Arthritis powders.     CC: Headache/abdominal pain    HPI: 33 y/o female with c/o headache and took ? Amount of BC powders for the headache.  She then developed vomiting and epigastric pain.  She states she is been drinking alcohol tonight.  Denies any vomiting of blood or coffee-ground material.  Abdominal pain is relegated to the epigastric region.  Severity is moderate.  No radiation of the pain to the back.  Patient states she was in her home and the ambulance drove up so she states she has no idea who called EMS.      Review of patient's allergies indicates:  No Known Allergies  Past Medical History:   Diagnosis Date    ADHD (attention deficit hyperactivity disorder)     Asthma     Hypertension      Past Surgical History:   Procedure Laterality Date    NO PAST SURGERIES       Family History   Problem Relation Age of Onset    Hypertension Mother     Diabetes Maternal Grandmother      Social History     Tobacco Use    Smoking status: Every Day     Packs/day: 0.50     Types: Cigarettes    Smokeless tobacco: Never   Substance Use Topics    Alcohol use: Yes    Drug use: Not Currently     Review of Systems   Constitutional: Negative.    HENT: Negative.     Eyes: Negative.    Respiratory: Negative.     Cardiovascular:  Negative for chest pain.   Gastrointestinal:  Positive for abdominal pain, nausea and vomiting. Negative for abdominal distention.   Genitourinary:  Negative for dysuria, flank pain, frequency and hematuria.   Musculoskeletal: Negative.    Neurological:  Positive for headaches.   Psychiatric/Behavioral: Negative.     All other systems reviewed and are negative.    Physical Exam     Initial Vitals [12/23/22 0341]   BP Pulse Resp Temp SpO2   (!) 143/103 96 20 98.9 °F (37.2 °C) 100 %      MAP       --         Physical  Exam    Nursing note and vitals reviewed.  Constitutional: She appears well-developed and well-nourished. She appears distressed.   Actively vomiting   HENT:   Head: Normocephalic and atraumatic.   Nose: Nose normal.   Mouth/Throat: Oropharynx is clear and moist.   Eyes: Conjunctivae and EOM are normal. Pupils are equal, round, and reactive to light. No scleral icterus.   Neck: Neck supple.   Normal range of motion.  Cardiovascular:  Normal rate, regular rhythm and intact distal pulses.           Pulmonary/Chest: Breath sounds normal.   Abdominal: Abdomen is soft. Bowel sounds are normal. There is abdominal tenderness (Minimal epigastric). There is no rebound and no guarding.   Musculoskeletal:         General: Normal range of motion.      Cervical back: Normal range of motion and neck supple.     Neurological: She is alert and oriented to person, place, and time. She has normal strength. GCS score is 15. GCS eye subscore is 4. GCS verbal subscore is 5. GCS motor subscore is 6.   Skin: Skin is warm and dry. Capillary refill takes less than 2 seconds.   Psychiatric: Her behavior is normal.       ED Course   Procedures  Labs Reviewed   COMPREHENSIVE METABOLIC PANEL - Abnormal; Notable for the following components:       Result Value    CO2 21 (*)     Anion Gap 17 (*)     All other components within normal limits   ACETAMINOPHEN LEVEL - Abnormal; Notable for the following components:    Acetaminophen (Tylenol), Serum <3.0 (*)     All other components within normal limits   ALCOHOL,MEDICAL (ETHANOL) - Abnormal; Notable for the following components:    Alcohol, Serum 301 (*)     All other components within normal limits    Narrative:     Alcohol critical result(s) called and verbal readback obtained from   Michael Powell RN  by SED 12/23/2022 05:24   ACETAMINOPHEN LEVEL - Abnormal; Notable for the following components:    Acetaminophen (Tylenol), Serum <3.0 (*)     All other components within normal limits   CBC W/ AUTO  DIFFERENTIAL   LIPASE   SALICYLATE LEVEL   SALICYLATE LEVEL   SALICYLATE LEVEL     Results for orders placed or performed during the hospital encounter of 12/23/22   CBC auto differential   Result Value Ref Range    WBC 8.76 3.90 - 12.70 K/uL    RBC 4.86 4.00 - 5.40 M/uL    Hemoglobin 13.7 12.0 - 16.0 g/dL    Hematocrit 42.4 37.0 - 48.5 %    MCV 87 82 - 98 fL    MCH 28.2 27.0 - 31.0 pg    MCHC 32.3 32.0 - 36.0 g/dL    RDW 13.2 11.5 - 14.5 %    Platelets 366 150 - 450 K/uL    MPV 10.1 9.2 - 12.9 fL    Immature Granulocytes 0.5 0.0 - 0.5 %    Gran # (ANC) 3.8 1.8 - 7.7 K/uL    Immature Grans (Abs) 0.04 0.00 - 0.04 K/uL    Lymph # 4.0 1.0 - 4.8 K/uL    Mono # 0.7 0.3 - 1.0 K/uL    Eos # 0.1 0.0 - 0.5 K/uL    Baso # 0.09 0.00 - 0.20 K/uL    nRBC 0 0 /100 WBC    Gran % 43.6 38.0 - 73.0 %    Lymph % 46.1 18.0 - 48.0 %    Mono % 7.8 4.0 - 15.0 %    Eosinophil % 1.0 0.0 - 8.0 %    Basophil % 1.0 0.0 - 1.9 %    Differential Method Automated    Comprehensive metabolic panel   Result Value Ref Range    Sodium 144 136 - 145 mmol/L    Potassium 3.7 3.5 - 5.1 mmol/L    Chloride 106 95 - 110 mmol/L    CO2 21 (L) 23 - 29 mmol/L    Glucose 92 70 - 110 mg/dL    BUN 10 6 - 20 mg/dL    Creatinine 0.7 0.5 - 1.4 mg/dL    Calcium 8.9 8.7 - 10.5 mg/dL    Total Protein 8.2 6.0 - 8.4 g/dL    Albumin 4.1 3.5 - 5.2 g/dL    Total Bilirubin 0.1 0.1 - 1.0 mg/dL    Alkaline Phosphatase 65 55 - 135 U/L    AST 17 10 - 40 U/L    ALT 18 10 - 44 U/L    Anion Gap 17 (H) 8 - 16 mmol/L    eGFR >60.0 >60 mL/min/1.73 m^2   Lipase   Result Value Ref Range    Lipase 48 4 - 60 U/L   Acetaminophen level   Result Value Ref Range    Acetaminophen (Tylenol), Serum <3.0 (L) 10.0 - 20.0 ug/mL   Ethanol   Result Value Ref Range    Alcohol, Serum 301 (HH) <10 mg/dL   Salicylate Level   Result Value Ref Range    Salicylate Lvl 26.3 15.0 - 30.0 mg/dL   Acetaminophen level   Result Value Ref Range    Acetaminophen (Tylenol), Serum <3.0 (L) 10.0 - 20.0 ug/mL   Salicylate  "level   Result Value Ref Range    Salicylate Lvl 15.6 15.0 - 30.0 mg/dL            Imaging Results    None          Medications   lactated ringers bolus 1,000 mL (0 mLs Intravenous Stopped 12/23/22 0536)   prochlorperazine injection Soln 5 mg (5 mg Intravenous Given 12/23/22 0437)   diphenhydrAMINE injection 12.5 mg (12.5 mg Intravenous Given 12/23/22 0437)     Medical Decision Making:   History:   I obtained history from: EMS provider.       <> Summary of History: And patient.  Initial Assessment:   Alcohol intoxication with possible alcoholic gastritis. Questionable history of taking "too many" BC powders but patient denies taking more than 2 powders  Clinical Tests:   Lab Tests: Ordered and Reviewed  ED Management:  Etoh is 301 and consistent with presenting condition,  Will await improvement in condition pending remaining labs,\  Vomiting has ceased 2' to Compazine. Somnolent but arousable.Abdomen with mild epigastric tenderness,.             ED Course as of 12/23/22 1633   Fri Dec 23, 2022   0848 Patient is sleeping, but arousable.  She states that she took some medication for headache last night.  Denies any suicidal intent.  Grandmother was here earlier and has left her phone number.  Grandmother will come pick patient up.  Repeat salicylate and acetaminophen level nontoxic.  Patient stable for discharge with a ride [DP]      ED Course User Index  [DP] Henok Purvis MD                 Clinical Impression:   Final diagnoses:  [F10.920] Alcoholic intoxication without complication (Primary)        ED Disposition Condition    Discharge Stable          ED Prescriptions    None       Follow-up Information       Follow up With Specialties Details Why Contact Info    Kelvin Brothers MD Family Medicine Call   [address] [facility]  [facility] [address]  [phone]      [facility] - Emergency Dept Emergency Medicine  As needed, If symptoms worsen [address]  [facility] " [id number]  [phone]             Henok Purvis MD  12/23/22 0786

## 2023-09-25 ENCOUNTER — HOSPITAL ENCOUNTER (EMERGENCY)
Facility: HOSPITAL | Age: 33
Discharge: HOME | End: 2023-09-25
Attending: EMERGENCY MEDICINE
Payer: MEDICAID

## 2023-09-25 VITALS
TEMPERATURE: 99 F | RESPIRATION RATE: 18 BRPM | OXYGEN SATURATION: 99 % | HEART RATE: 116 BPM | SYSTOLIC BLOOD PRESSURE: 130 MMHG | BODY MASS INDEX: 27.95 KG/M2 | WEIGHT: 163.69 LBS | HEIGHT: 64 IN | DIASTOLIC BLOOD PRESSURE: 81 MMHG

## 2023-09-25 DIAGNOSIS — R05.9 COUGH: ICD-10-CM

## 2023-09-25 DIAGNOSIS — J06.9 UPPER RESPIRATORY TRACT INFECTION, UNSPECIFIED TYPE: Primary | ICD-10-CM

## 2023-09-25 LAB
B-HCG UR QL: NEGATIVE
CTP QC/QA: YES
CTP QC/QA: YES
POC MOLECULAR INFLUENZA A AGN: NEGATIVE
POC MOLECULAR INFLUENZA B AGN: NEGATIVE
SARS-COV-2 RDRP RESP QL NAA+PROBE: NEGATIVE

## 2023-09-25 PROCEDURE — 81025 URINE PREGNANCY TEST: CPT | Mod: ER | Performed by: PHYSICIAN ASSISTANT

## 2023-09-25 PROCEDURE — 87635 SARS-COV-2 COVID-19 AMP PRB: CPT | Mod: ER | Performed by: EMERGENCY MEDICINE

## 2023-09-25 PROCEDURE — 99283 EMERGENCY DEPT VISIT LOW MDM: CPT | Mod: 25,ER

## 2023-09-25 PROCEDURE — 87502 INFLUENZA DNA AMP PROBE: CPT | Mod: ER

## 2023-09-25 RX ORDER — IBUPROFEN 200 MG
1 TABLET ORAL DAILY
Qty: 30 PATCH | Refills: 0 | Status: SHIPPED | OUTPATIENT
Start: 2023-09-25

## 2023-09-25 NOTE — ED PROVIDER NOTES
History      Chief Complaint   Patient presents with    Cough     X 4 days with congestion. Pt. Reports cough is causing her chest to hurt.     Generalized Body Aches       Review of patient's allergies indicates:  No Known Allergies     HPI   HPI    9/25/2023, 6:30 PM   History obtained from the patient      History of Present Illness: Patricia Melendez is a 33 y.o. female patient who presents to the Emergency Department for cough and nasal congestion for a week.    No further complaints or concerns at this time.           PCP: Kelvin Brothers MD (Inactive)       Past Medical History:  Past Medical History:   Diagnosis Date    ADHD (attention deficit hyperactivity disorder)     Asthma     Hypertension          Past Surgical History:  Past Surgical History:   Procedure Laterality Date    NO PAST SURGERIES             Family History:  Family History   Problem Relation Age of Onset    Hypertension Mother     Diabetes Maternal Grandmother            Social History:  Social History     Tobacco Use    Smoking status: Every Day     Current packs/day: 0.50     Types: Cigarettes    Smokeless tobacco: Never   Substance and Sexual Activity    Alcohol use: Yes    Drug use: Not Currently    Sexual activity: Yes     Partners: Male     Birth control/protection: None       ROS     Review of Systems   HENT:  Positive for congestion.    Respiratory:  Positive for cough. Negative for shortness of breath.    Gastrointestinal:  Negative for vomiting.       Physical Exam      Initial Vitals [09/25/23 1208]   BP Pulse Resp Temp SpO2   130/81 (!) 116 18 99.4 °F (37.4 °C) 99 %      MAP       --         Physical Exam  Vital signs and nursing notes reviewed.  Constitutional: Patient is in NAD. Awake and alert. Well-developed and well-nourished.  Head: Atraumatic. Normocephalic.  Eyes:  EOM intact. Conjunctivae nl. No scleral icterus.  ENT: Mucous membranes are moist.  Pharynx clear  Neck: Supple.  No meningismus  Cardiovascular: Regular  "rate and rhythm. No murmurs, rubs, or gallops.   Pulmonary/Chest: No respiratory distress. Clear to auscultation bilaterally. No wheezing, rales, or rhonchi.  Abdominal: Soft. Non-distended. No TTP. No rebound, guarding, or rigidity. Good bowel sounds.  Genitourinary: No CVA tenderness  Musculoskeletal: Moves all extremities. No edema.   Skin: Warm and dry.  Neurological: Awake and alert. No acute focal neurological deficits are appreciated.  Psychiatric: Normal affect. Good eye contact. Appropriate in content.      ED Course          Procedures  ED Vital Signs:  Vitals:    09/25/23 1208   BP: 130/81   Pulse: (!) 116   Resp: 18   Temp: 99.4 °F (37.4 °C)   TempSrc: Oral   SpO2: 99%   Weight: 74.2 kg (163 lb 11.1 oz)   Height: 5' 4" (1.626 m)         Results for orders placed or performed during the hospital encounter of 09/25/23   Pregnancy, urine rapid   Result Value Ref Range    Preg Test, Ur Negative    POCT COVID-19 Rapid Screening   Result Value Ref Range    POC Rapid COVID Negative Negative     Acceptable Yes    POCT Influenza A/B Molecular   Result Value Ref Range    POC Molecular Influenza A Ag Negative Negative, Not Reported    POC Molecular Influenza B Ag Negative Negative, Not Reported     Acceptable Yes              Imaging Results:  Imaging Results              X-Ray Chest 1 View (Final result)  Result time 09/25/23 13:46:43      Final result by Anthony Upton MD (09/25/23 13:46:43)                   Impression:      No acute cardiopulmonary disease.      Electronically signed by: Anthony Upton MD  Date:    09/25/2023  Time:    13:46               Narrative:    EXAMINATION:  XR CHEST 1 VIEW    CLINICAL HISTORY:  Cough, unspecified    COMPARISON:  Chest x-ray, 02/24/2019    FINDINGS:  The lungs are clear. The cardiac silhouette is within normal limits. No pleural effusion or pneumothorax or pulmonary edema.  Intact thoracic osseous structures on the single view.            "                              The Emergency Provider reviewed the vital signs and test results, which are outlined above.    ED Discussion             Medication(s) given in the ER:  Medications - No data to display         Follow-up Information       Kelvin Brothers MD In 3 days.    Specialty: Family Medicine  Contact information:  [address] [facility]  [place] [address]  [phone]                                    Medication List        START taking these medications      nicotine 21 mg/24 hr  Commonly known as: NICODERM CQ  Place 1 patch onto the skin once daily.            ASK your doctor about these medications      ibuprofen 400 MG tablet  Commonly known as: ADVIL,MOTRIN  Take 1 tablet (400 mg total) by mouth every 6 (six) hours as needed for Other (pain or fever).     metroNIDAZOLE 500 MG tablet  Commonly known as: FLAGYL  Take 1 tablet (500 mg total) by mouth every 12 (twelve) hours.     NIFEdipine 30 MG (OSM) 24 hr tablet  Commonly known as: PROCARDIA-XL  Take 1 tablet (30 mg total) by mouth once daily.     omeprazole 40 MG capsule  Commonly known as: PRILOSEC  Take 1 capsule (40 mg total) by mouth once daily.               Where to Get Your Medications        These medications were sent to [facility]/pharmacy #[id number] - [place], [place] - [address] [address] [address]      Phone: [phone]   nicotine 21 mg/24 hr             Medical Decision Making        All findings were reviewed with the patient/family in detail.   All remaining questions and concerns were addressed at that time.  Patient/family has been counseled regarding the need for follow-up as well as the indication to return to the emergency room should new or worrisome developments occur.        MDM     Amount and/or Complexity of Data Reviewed  Clinical lab tests: ordered and reviewed  Tests in the radiology section of CPT®: ordered and reviewed                   Clinical Impression:         ICD-10-CM ICD-9-CM   1. Upper respiratory tract infection, unspecified type  J06.9 465.9   2. Cough  R05.9 786.2               Ayala Langston, PA-C  09/25/23 1839

## 2023-11-08 ENCOUNTER — HOSPITAL ENCOUNTER (EMERGENCY)
Facility: HOSPITAL | Age: 33
Discharge: HOME | End: 2023-11-08
Attending: EMERGENCY MEDICINE
Payer: MEDICAID

## 2023-11-08 VITALS
HEART RATE: 67 BPM | TEMPERATURE: 98 F | RESPIRATION RATE: 16 BRPM | WEIGHT: 165.38 LBS | SYSTOLIC BLOOD PRESSURE: 132 MMHG | OXYGEN SATURATION: 100 % | BODY MASS INDEX: 28.38 KG/M2 | DIASTOLIC BLOOD PRESSURE: 93 MMHG

## 2023-11-08 DIAGNOSIS — R07.89 CHEST PAIN, ATYPICAL: Primary | ICD-10-CM

## 2023-11-08 DIAGNOSIS — R07.9 CHEST PAIN: ICD-10-CM

## 2023-11-08 LAB
ALBUMIN SERPL BCP-MCNC: 3.9 G/DL (ref 3.5–5.2)
ALP SERPL-CCNC: 68 U/L (ref 55–135)
ALT SERPL W/O P-5'-P-CCNC: 9 U/L (ref 10–44)
ANION GAP SERPL CALC-SCNC: 12 MMOL/L (ref 8–16)
AST SERPL-CCNC: 14 U/L (ref 10–40)
B-HCG UR QL: NEGATIVE
BASOPHILS # BLD AUTO: 0.07 K/UL (ref 0–0.2)
BASOPHILS NFR BLD: 1.2 % (ref 0–1.9)
BILIRUB SERPL-MCNC: 0.5 MG/DL (ref 0.1–1)
BILIRUB UR QL STRIP: NEGATIVE
BUN SERPL-MCNC: 10 MG/DL (ref 6–20)
CALCIUM SERPL-MCNC: 9.2 MG/DL (ref 8.7–10.5)
CHLORIDE SERPL-SCNC: 103 MMOL/L (ref 95–110)
CK SERPL-CCNC: 92 U/L (ref 20–180)
CLARITY UR REFRACT.AUTO: CLEAR
CO2 SERPL-SCNC: 22 MMOL/L (ref 23–29)
COLOR UR AUTO: YELLOW
CREAT SERPL-MCNC: 0.7 MG/DL (ref 0.5–1.4)
DIFFERENTIAL METHOD: NORMAL
EOSINOPHIL # BLD AUTO: 0.2 K/UL (ref 0–0.5)
EOSINOPHIL NFR BLD: 2.5 % (ref 0–8)
ERYTHROCYTE [DISTWIDTH] IN BLOOD BY AUTOMATED COUNT: 14 % (ref 11.5–14.5)
EST. GFR  (NO RACE VARIABLE): >60 ML/MIN/1.73 M^2
GLUCOSE SERPL-MCNC: 114 MG/DL (ref 70–110)
GLUCOSE UR QL STRIP: NEGATIVE
HCT VFR BLD AUTO: 40.9 % (ref 37–48.5)
HGB BLD-MCNC: 13.1 G/DL (ref 12–16)
HGB UR QL STRIP: NEGATIVE
IMM GRANULOCYTES # BLD AUTO: 0.02 K/UL (ref 0–0.04)
IMM GRANULOCYTES NFR BLD AUTO: 0.3 % (ref 0–0.5)
KETONES UR QL STRIP: NEGATIVE
LEUKOCYTE ESTERASE UR QL STRIP: NEGATIVE
LYMPHOCYTES # BLD AUTO: 2.3 K/UL (ref 1–4.8)
LYMPHOCYTES NFR BLD: 38.9 % (ref 18–48)
MCH RBC QN AUTO: 29 PG (ref 27–31)
MCHC RBC AUTO-ENTMCNC: 32 G/DL (ref 32–36)
MCV RBC AUTO: 91 FL (ref 82–98)
MONOCYTES # BLD AUTO: 0.6 K/UL (ref 0.3–1)
MONOCYTES NFR BLD: 10.3 % (ref 4–15)
NEUTROPHILS # BLD AUTO: 2.8 K/UL (ref 1.8–7.7)
NEUTROPHILS NFR BLD: 46.8 % (ref 38–73)
NITRITE UR QL STRIP: NEGATIVE
NRBC BLD-RTO: 0 /100 WBC
PH UR STRIP: 6 [PH] (ref 5–8)
PLATELET # BLD AUTO: 332 K/UL (ref 150–450)
PMV BLD AUTO: 10.3 FL (ref 9.2–12.9)
POTASSIUM SERPL-SCNC: 3.7 MMOL/L (ref 3.5–5.1)
PROT SERPL-MCNC: 7.6 G/DL (ref 6–8.4)
PROT UR QL STRIP: NEGATIVE
RBC # BLD AUTO: 4.51 M/UL (ref 4–5.4)
SODIUM SERPL-SCNC: 137 MMOL/L (ref 136–145)
SP GR UR STRIP: 1.02 (ref 1–1.03)
TROPONIN I SERPL DL<=0.01 NG/ML-MCNC: <0.006 NG/ML (ref 0–0.03)
URN SPEC COLLECT METH UR: NORMAL
UROBILINOGEN UR STRIP-ACNC: <2 EU/DL
WBC # BLD AUTO: 5.91 K/UL (ref 3.9–12.7)

## 2023-11-08 PROCEDURE — 93010 ELECTROCARDIOGRAM REPORT: CPT | Mod: ,,, | Performed by: INTERNAL MEDICINE

## 2023-11-08 PROCEDURE — 80053 COMPREHEN METABOLIC PANEL: CPT | Mod: ER | Performed by: NURSE PRACTITIONER

## 2023-11-08 PROCEDURE — 25000003 PHARM REV CODE 250: Mod: ER | Performed by: NURSE PRACTITIONER

## 2023-11-08 PROCEDURE — 81003 URINALYSIS AUTO W/O SCOPE: CPT | Mod: ER | Performed by: NURSE PRACTITIONER

## 2023-11-08 PROCEDURE — 84484 ASSAY OF TROPONIN QUANT: CPT | Mod: ER | Performed by: NURSE PRACTITIONER

## 2023-11-08 PROCEDURE — 82550 ASSAY OF CK (CPK): CPT | Mod: ER | Performed by: NURSE PRACTITIONER

## 2023-11-08 PROCEDURE — 81025 URINE PREGNANCY TEST: CPT | Mod: ER | Performed by: NURSE PRACTITIONER

## 2023-11-08 PROCEDURE — 99285 EMERGENCY DEPT VISIT HI MDM: CPT | Mod: 25,ER

## 2023-11-08 PROCEDURE — 93005 ELECTROCARDIOGRAM TRACING: CPT | Mod: ER

## 2023-11-08 PROCEDURE — 85025 COMPLETE CBC W/AUTO DIFF WBC: CPT | Mod: ER | Performed by: NURSE PRACTITIONER

## 2023-11-08 PROCEDURE — 93010 EKG 12-LEAD: ICD-10-PCS | Mod: ,,, | Performed by: INTERNAL MEDICINE

## 2023-11-08 RX ORDER — AMLODIPINE BESYLATE 5 MG/1
5 TABLET ORAL DAILY
Qty: 30 TABLET | Refills: 0 | Status: SHIPPED | OUTPATIENT
Start: 2023-11-08 | End: 2024-11-07

## 2023-11-08 RX ADMIN — SODIUM CHLORIDE 1000 ML: 9 INJECTION, SOLUTION INTRAVENOUS at 05:11

## 2023-11-08 NOTE — Clinical Note
"Patricia RIGGS" Nora was seen and treated in our emergency department on 11/8/2023.  She may return to work on 11/10/2023.       If you have any questions or concerns, please don't hesitate to call.      Eldon Cheema, FRED"

## 2023-11-10 NOTE — ED PROVIDER NOTES
Encounter Date: 11/8/2023       History     Chief Complaint   Patient presents with    Fatigue     Sleeping all the time but still feeling tired. Tingling in feet sometimes. Felt light headed yesterday.     Patient complains of fatigue and weakness that seems to be waxing and waning at times.  Also reports some chest tightness.  Reports recent URI symptoms        Review of patient's allergies indicates:  No Known Allergies  Past Medical History:   Diagnosis Date    ADHD (attention deficit hyperactivity disorder)     Asthma     Hypertension      Past Surgical History:   Procedure Laterality Date    NO PAST SURGERIES       Family History   Problem Relation Age of Onset    Hypertension Mother     Diabetes Maternal Grandmother      Social History     Tobacco Use    Smoking status: Every Day     Current packs/day: 0.50     Types: Cigarettes    Smokeless tobacco: Never   Substance Use Topics    Alcohol use: Yes    Drug use: Not Currently     Review of Systems   Constitutional:  Negative for fever.   HENT:  Negative for sore throat.    Respiratory:  Negative for shortness of breath.    Cardiovascular:  Negative for chest pain.   Gastrointestinal:  Negative for nausea.   Genitourinary:  Negative for dysuria.   Musculoskeletal:  Negative for back pain.   Skin:  Negative for rash.   Neurological:  Negative for weakness.   Hematological:  Does not bruise/bleed easily.       Physical Exam     Initial Vitals [11/08/23 1710]   BP Pulse Resp Temp SpO2   (!) 157/102 67 16 98 °F (36.7 °C) 100 %      MAP       --         Physical Exam    Nursing note and vitals reviewed.  Constitutional: She appears well-developed and well-nourished. She is not diaphoretic. She is active.  Non-toxic appearance. No distress.   HENT:   Head: Normocephalic and atraumatic.   Eyes: Conjunctivae are normal. Right eye exhibits no discharge. Left eye exhibits no discharge. No scleral icterus.   Neck:   Normal range of motion.  Cardiovascular:  Normal rate,  regular rhythm and intact distal pulses.           No murmur heard.  Pulmonary/Chest: Breath sounds normal. No respiratory distress. She has no wheezes.   Abdominal: She exhibits no distension.   Musculoskeletal:         General: No tenderness. Normal range of motion.      Cervical back: Normal range of motion.     Neurological: She is alert and oriented to person, place, and time. No cranial nerve deficit. GCS score is 15. GCS eye subscore is 4. GCS verbal subscore is 5. GCS motor subscore is 6.   Skin: Skin is warm and dry. Capillary refill takes less than 2 seconds. No rash noted.   Psychiatric: She has a normal mood and affect. Her behavior is normal. Judgment and thought content normal.         ED Course   Procedures  Labs Reviewed   COMPREHENSIVE METABOLIC PANEL - Abnormal; Notable for the following components:       Result Value    CO2 22 (*)     Glucose 114 (*)     ALT 9 (*)     All other components within normal limits    Narrative:     Release to patient->Immediate   CBC W/ AUTO DIFFERENTIAL    Narrative:     Release to patient->Immediate   URINALYSIS, REFLEX TO URINE CULTURE    Narrative:     Specimen Source->Urine   PREGNANCY TEST, URINE RAPID    Narrative:     Specimen Source->Urine   TROPONIN I    Narrative:     Release to patient->Immediate   CK    Narrative:     Release to patient->Immediate        ECG Results              EKG 12-lead (In process)  Result time 11/09/23 08:02:06      In process by Interface, Lab [facility] (11/09/23 08:02:06)                   Narrative:    Test Reason : R07.9,    Vent. Rate : 070 BPM     Atrial Rate : 070 BPM     P-R Int : 186 ms          QRS Dur : 082 ms      QT Int : 394 ms       P-R-T Axes : 049 016 022 degrees     QTc Int : 425 ms    Normal sinus rhythm with sinus arrhythmia  Normal ECG  When compared with ECG of 24-FEB-2019 16:15,  Nonspecific T wave abnormality, improved in Inferior leads  Nonspecific T wave abnormality no longer evident in Lateral  leads    Referred By: [email or web address]   SELF           Confirmed By:                                   Imaging Results              X-Ray Chest 1 View (Final result)  Result time 11/08/23 18:39:44      Final result by Villa Kidd MD (11/08/23 18:39:44)                   Impression:      No acute abnormality.      Electronically signed by: Villa Kidd  Date:    11/08/2023  Time:    18:39               Narrative:    EXAMINATION:  XR CHEST 1 VIEW    CLINICAL HISTORY:  Chest pain, unspecified    TECHNIQUE:  Single frontal view of the chest was performed.    COMPARISON:  None    FINDINGS:  The lungs are clear, with normal appearance of pulmonary vasculature and no pleural effusion or pneumothorax.    The cardiac silhouette is normal in size. The hilar and mediastinal contours are unremarkable.    Bones are intact.                                       Medications   sodium chloride 0.9% bolus 1,000 mL 1,000 mL (0 mLs Intravenous Stopped 11/8/23 1812)     Medical Decision Making  Amount and/or Complexity of Data Reviewed  Labs: ordered.  Radiology: ordered.    Risk  Prescription drug management.                               Clinical Impression:   Final diagnoses:  [R07.9] Chest pain  [R07.89] Chest pain, atypical (Primary)        ED Disposition Condition    Discharge Stable          ED Prescriptions       Medication Sig Dispense Start Date End Date Auth. Provider    amLODIPine (NORVASC) 5 MG tablet Take 1 tablet (5 mg total) by mouth once daily. 30 tablet 11/8/2023 11/7/2024 Eldon Cheema NP          Follow-up Information       Follow up With Specialties Details Why Contact Info    Kelvin Brothers MD Family Medicine Schedule an appointment as soon as possible for a visit in 2 days  [address] [facility]  [facility] [address]  [phone]               Eldon Cheema NP  11/09/23 6775

## 2023-11-11 ENCOUNTER — HOSPITAL ENCOUNTER (EMERGENCY)
Facility: HOSPITAL | Age: 33
Discharge: HOME | End: 2023-11-11
Attending: EMERGENCY MEDICINE
Payer: MEDICAID

## 2023-11-11 VITALS
BODY MASS INDEX: 28.32 KG/M2 | DIASTOLIC BLOOD PRESSURE: 91 MMHG | RESPIRATION RATE: 20 BRPM | HEIGHT: 64 IN | WEIGHT: 165.88 LBS | OXYGEN SATURATION: 100 % | HEART RATE: 81 BPM | SYSTOLIC BLOOD PRESSURE: 138 MMHG | TEMPERATURE: 98 F

## 2023-11-11 DIAGNOSIS — S93.402A MILD ANKLE SPRAIN, LEFT, INITIAL ENCOUNTER: Primary | ICD-10-CM

## 2023-11-11 DIAGNOSIS — S80.12XA CONTUSION OF LEFT LOWER LEG, INITIAL ENCOUNTER: ICD-10-CM
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
## 2023-11-11 PROCEDURE — 25000003 PHARM REV CODE 250: Mod: ER | Performed by: NURSE PRACTITIONER

## 2023-11-11 PROCEDURE — 99283 EMERGENCY DEPT VISIT LOW MDM: CPT | Mod: ER

## 2023-11-11 RX ORDER — ACETAMINOPHEN 500 MG
1000 TABLET ORAL
Status: COMPLETED | OUTPATIENT
Start: 2023-11-11 | End: 2023-11-11

## 2023-11-11 RX ORDER — IBUPROFEN 400 MG/1
400 TABLET ORAL EVERY 6 HOURS PRN
Qty: 21 TABLET | Refills: 0 | Status: SHIPPED | OUTPATIENT
Start: 2023-11-11

## 2023-11-11 RX ADMIN — ACETAMINOPHEN 1000 MG: 500 TABLET ORAL at 01:11

## 2023-11-11 NOTE — ED PROVIDER NOTES
Encounter Date: 11/11/2023       History     Chief Complaint   Patient presents with    Leg Injury     States she slipped on wet floors and hurt the left leg. No obvious swelling or deformity. Pt ambulates with no difficulty.      Patricia Melendez is a 33 y.o. female presents to ED with complaint slip and fall at  [employer] yesterday. Floor was wet since there had been clean up of pasta sauce glass jaw broke. Slipped and fell hitting left lower lateral leg. She states she did not think anything of fall until she awoke this morning and noticed bruising left lower leg, left lateral ankle pain with walking, no ankle or leg pain at rest. Lower upper 1/3 lateral leg pain with palpation only at site of bruise. 2 small scratches left lower leg upper 1/3, she felt pain to touch upper abrasion and wanted to make sure no glass.  She states the glass had been cleaned up prior to her fall. She was able to rise and walk at time of incident and continues ambulatory.       The history is provided by the patient.     Review of patient's allergies indicates:  No Known Allergies  Past Medical History:   Diagnosis Date    ADHD (attention deficit hyperactivity disorder)     Asthma     Hypertension      Past Surgical History:   Procedure Laterality Date    NO PAST SURGERIES       Family History   Problem Relation Age of Onset    Hypertension Mother     Diabetes Maternal Grandmother      Social History     Tobacco Use    Smoking status: Every Day     Current packs/day: 0.50     Types: Cigarettes    Smokeless tobacco: Never   Substance Use Topics    Alcohol use: Yes    Drug use: Not Currently     Review of Systems   Constitutional:  Positive for activity change. Negative for appetite change, chills, diaphoresis, fatigue and fever.   HENT: Negative.     Eyes:  Negative for visual disturbance.   Respiratory:  Negative for cough, shortness of breath and wheezing.    Cardiovascular:  Negative for chest pain, palpitations and leg  swelling.   Gastrointestinal:  Negative for abdominal distention, abdominal pain, blood in stool, constipation, diarrhea, nausea and vomiting.   Endocrine: Negative for cold intolerance and heat intolerance.   Genitourinary:  Negative for decreased urine volume, difficulty urinating, dysuria, frequency, hematuria and urgency.   Musculoskeletal:  Negative for arthralgias, back pain, gait problem, joint swelling, neck pain and neck stiffness.   Skin:  Positive for color change (ecchymosis 3x5 left lower leg lateral just below knee) and wound (very tiny abrasion left lower leg upper 1/3). Negative for rash.   Neurological: Negative.  Negative for dizziness, syncope, speech difficulty, light-headedness and headaches.   Psychiatric/Behavioral:  Negative for agitation, confusion and hallucinations. The patient is not nervous/anxious.        Physical Exam     Initial Vitals [11/11/23 1226]   BP Pulse Resp Temp SpO2   (!) 138/91 81 20 98.2 °F (36.8 °C) 100 %      MAP       --         Physical Exam    Nursing note and vitals reviewed.  Constitutional: She appears well-developed and well-nourished.   HENT:   Head: Normocephalic and atraumatic.   Eyes: Conjunctivae are normal.   Neck: Neck supple.   Normal range of motion.  Cardiovascular:  Normal rate, regular rhythm, normal heart sounds and intact distal pulses.           Pulmonary/Chest: Breath sounds normal.   Abdominal: Abdomen is soft.   Musculoskeletal:         General: Normal range of motion.      Cervical back: Normal range of motion and neck supple.      Right knee: Normal.      Left knee: Normal.      Right lower leg: Normal.      Left lower leg: Swelling (mild lateral upper 1/3 with eccyhmosis 3 x 5 cm), laceration (2 very tiny abrasion upper 1/3 lateral leg) and tenderness present. No deformity or bony tenderness.      Right ankle: Normal.      Left ankle: No swelling, deformity, ecchymosis or lacerations. Tenderness present over the ATF ligament. No lateral  malleolus, medial malleolus, CF ligament, posterior TF ligament, base of 5th metatarsal or proximal fibula tenderness. Normal range of motion. Anterior drawer test negative. Normal pulse.      Left Achilles Tendon: Normal.     Neurological: She is alert and oriented to person, place, and time. She has normal strength and normal reflexes.   Skin: Skin is warm and dry. Capillary refill takes less than 2 seconds.   Psychiatric: She has a normal mood and affect. Her behavior is normal. Thought content normal.           ED Course   Procedures  Labs Reviewed - No data to display       Imaging Results              X-Ray Ankle Complete Left (Final result)  Result time 11/11/23 13:23:41      Final result by Hansel Hu MD (11/11/23 13:23:41)                   Impression:      No acute abnormality.      Electronically signed by: Hansel Hu MD  Date:    11/11/2023  Time:    13:23               Narrative:    EXAMINATION:  XR ANKLE COMPLETE 3 VIEW LEFT    CLINICAL HISTORY:  Left ankle pain.  Fall on same level, unspecified, initial encounter    TECHNIQUE:  Standard radiography performed.    COMPARISON:  None    FINDINGS:  The talar dome is intact.    There are small radiodensities inferior to the lateral malleolus which are nonspecific.  Probable dystrophic calcification related to previous soft tissue/ligamentous injury.    No soft tissue swelling.                                       X-Ray Tibia Fibula 2 View Left (Final result)  Result time 11/11/23 13:22:40      Final result by Hansel Hu MD (11/11/23 13:22:40)                   Impression:      Negative exam.      Electronically signed by: Hansel Hu MD  Date:    11/11/2023  Time:    13:22               Narrative:    EXAMINATION:  XR TIBIA FIBULA 2 VIEW LEFT    CLINICAL HISTORY:  Left lower leg pain.  Fall on same level from slipping, tripping and stumbling with subsequent striking against unspecified sharp object, initial  encounter    TECHNIQUE:  Standard radiography performed.    COMPARISON:  None    FINDINGS:  Bone density and architecture are normal.  No acute findings.                                       Medications   acetaminophen tablet 1,000 mg (1,000 mg Oral Given 11/11/23 1311)     Medical Decision Making  Amount and/or Complexity of Data Reviewed  Radiology: ordered and independent interpretation performed.     Details: No acute findings of left leg or ankle.                ED Course as of 11/11/23 1344   Sat Nov 11, 2023   1330 No foreign body seen on xray or exam of tiny abrasions left leg. No acute fracture, ace wrap applied figure 8 pattern with stated comfort. Patient states ready for d/c declines crutches. States comfort reduced pain with ace wrap in place. Normal capillary refill skin color post ace wrap.    1:36 PM Reassessed patient at this time.  Pt states condition has improved with ace wrap and tylenol. Discussed with pt all pertinent ED information and results. Discussed pt dx and plan of tx. Gave patient or family all follow up and return to the ED instructions. All questions and concerns were addressed at this time. Patient or family express understanding of information and instructions, and is comfortable with plan to discharge. Patient is stable for discharge.       [CG]   1339 Strong recommendation for complete smoking cessation with review of quit tips and written information per after visit summary.  The patient states knowledge of importance of quitting and multiple health risk to continued smoking. She will  nicotine patches prescribed by ED September 2023.      [CG]      ED Course User Index  [CG] Joann Oneal NP                    Clinical Impression:   Final diagnoses:  [W18.30XA] Fall on same level as cause of accidental injury  [W01.119A] Fall on broken glass, initial encounter  [W01.119A] Fall on broken glass, initial encounter - left lateral leg just below knee r/o foreign  body  [S93.402A] Mild ankle sprain, left, initial encounter (Primary)  [S80.12XA] Contusion of left lower leg, initial encounter        ED Disposition Condition    Discharge Stable          ED Prescriptions       Medication Sig Dispense Start Date End Date Auth. Provider    ibuprofen (ADVIL,MOTRIN) 400 MG tablet Take 1 tablet (400 mg total) by mouth every 6 (six) hours as needed for Other (pain or fever). 21 tablet 11/11/2023 -- Joann Oneal NP          Follow-up Information       Follow up With Specialties Details Why Contact Info    Clinic, [facility]  In 3 days re-evaluate left ankle sprain [address] [place] [address] [address]  [facility] [address]  [phone]      Kelvin Brothers MD Family Medicine  As needed [address] [facility]  [facility] [id number] [phone]      [facility] - Emergency Dept Emergency Medicine  If symptoms worsen [id number] [facility] [address] [facility] [address]  [facility] [address] [phone]             Joann Oneal NP  11/11/23 4925

## 2023-11-22 ENCOUNTER — HOSPITAL ENCOUNTER (EMERGENCY)
Facility: HOSPITAL | Age: 33
Discharge: HOME | End: 2023-11-22
Attending: EMERGENCY MEDICINE
Payer: MEDICAID

## 2023-11-22 VITALS
DIASTOLIC BLOOD PRESSURE: 96 MMHG | BODY MASS INDEX: 28.91 KG/M2 | HEART RATE: 99 BPM | OXYGEN SATURATION: 99 % | RESPIRATION RATE: 17 BRPM | HEIGHT: 64 IN | SYSTOLIC BLOOD PRESSURE: 167 MMHG | TEMPERATURE: 98 F | WEIGHT: 169.31 LBS

## 2023-11-22 DIAGNOSIS — S00.03XA CONTUSION OF SCALP, INITIAL ENCOUNTER: Primary | ICD-10-CM

## 2023-11-22 DIAGNOSIS — R51.9 HEADACHE, OCCIPITAL: ICD-10-CM

## 2023-11-22 PROCEDURE — 99284 EMERGENCY DEPT VISIT MOD MDM: CPT | Mod: 25,ER

## 2023-11-22 PROCEDURE — 25000003 PHARM REV CODE 250: Mod: ER | Performed by: NURSE PRACTITIONER

## 2023-11-22 RX ORDER — BUTALBITAL, ACETAMINOPHEN AND CAFFEINE 50; 325; 40 MG/1; MG/1; MG/1
1 TABLET ORAL EVERY 6 HOURS PRN
Qty: 15 TABLET | Refills: 0 | Status: SHIPPED | OUTPATIENT
Start: 2023-11-22

## 2023-11-22 RX ORDER — BUTALBITAL, ACETAMINOPHEN AND CAFFEINE 50; 325; 40 MG/1; MG/1; MG/1
1 TABLET ORAL
Status: COMPLETED | OUTPATIENT
Start: 2023-11-22 | End: 2023-11-22

## 2023-11-22 RX ADMIN — BUTALBITAL, ACETAMINOPHEN, AND CAFFEINE 1 TABLET: 325; 50; 40 TABLET ORAL at 06:11

## 2023-11-22 NOTE — Clinical Note
"Patricia LÓPEZMAURICE Melendez was seen and treated in our emergency department on 11/22/2023.  She may return to school on 11/27/2023.      If you have any questions or concerns, please don't hesitate to call.      Elpidio Riojas, NP"

## 2023-11-23 NOTE — ED PROVIDER NOTES
Encounter Date: 11/22/2023       History     Chief Complaint   Patient presents with    Fall     Slipped and hit head on wood porch. Denies LOC. Complains of headache     33-year-old female with complaint of headache and dizziness after trip and fall just prior to arrival.  Patient reports she slipped on a wet floor and struck her head on wood rail adjacent to door.  Patient denies loss of consciousness.  Patient reports moderate headache at present.  No neck pain.        Review of patient's allergies indicates:  No Known Allergies  Past Medical History:   Diagnosis Date    ADHD (attention deficit hyperactivity disorder)     Asthma     Hypertension      Past Surgical History:   Procedure Laterality Date    NO PAST SURGERIES       Family History   Problem Relation Age of Onset    Hypertension Mother     Diabetes Maternal Grandmother      Social History     Tobacco Use    Smoking status: Every Day     Current packs/day: 0.50     Types: Cigarettes    Smokeless tobacco: Never   Substance Use Topics    Alcohol use: Yes    Drug use: Not Currently     Review of Systems   Constitutional:  Negative for fever.   HENT:  Negative for sore throat.    Respiratory:  Negative for shortness of breath.    Cardiovascular:  Negative for chest pain.   Gastrointestinal:  Negative for nausea.   Genitourinary:  Negative for dysuria.   Musculoskeletal:  Negative for back pain.   Skin:  Negative for rash.   Neurological:  Positive for headaches. Negative for weakness.   Hematological:  Does not bruise/bleed easily.       Physical Exam     Initial Vitals [11/22/23 1831]   BP Pulse Resp Temp SpO2   (!) 167/96 99 17 98.4 °F (36.9 °C) 99 %      MAP       --         Physical Exam    Nursing note and vitals reviewed.  Constitutional: She appears well-developed and well-nourished.   HENT:   Head: Normocephalic and atraumatic.   Eyes: Conjunctivae and EOM are normal. Pupils are equal, round, and reactive to light.   Neck: Neck supple.   Normal range  of motion.  Cardiovascular:  Normal rate, regular rhythm, normal heart sounds and intact distal pulses.           Pulmonary/Chest: Breath sounds normal.   Abdominal: Abdomen is soft. There is no abdominal tenderness. There is no rebound and no guarding.   Musculoskeletal:         General: Normal range of motion.      Cervical back: Normal range of motion and neck supple.      Comments: No spine tenderness, full ROM X 4, 5/5 X 4     Neurological: She is alert and oriented to person, place, and time. She has normal strength and normal reflexes.   Skin: Skin is warm and dry.   Psychiatric: She has a normal mood and affect. Her behavior is normal. Thought content normal.         ED Course   Procedures  Labs Reviewed - No data to display       Imaging Results              CT Head Without Contrast (In process)                      Medications   butalbital-acetaminophen-caffeine -40 mg per tablet 1 tablet (1 tablet Oral Given 11/22/23 1836)     Medical Decision Making  7:31 PM  Patient reports she needs to leave before CT is resulted.  Patient reports he will get results on my [facility] out.  Do not suspect any head injury.  Patient will return for any worsening.  Patient will evaluate [facility] from [facility].    Amount and/or Complexity of Data Reviewed  Radiology: ordered.    Risk  Prescription drug management.                                   Clinical Impression:  Final diagnoses:  [S00.03XA] Contusion of scalp, initial encounter (Primary)  [R51.9] Headache, occipital          ED Disposition Condition    Discharge Stable          ED Prescriptions       Medication Sig Dispense Start Date End Date Auth. Provider    butalbital-acetaminophen-caffeine -40 mg (FIORICET, ESGIC) -40 mg per tablet Take 1 tablet by mouth every 6 (six) hours as needed for Headaches. 15 tablet 11/22/2023 -- Elpidio Riojas NP          Follow-up Information       Follow up With Specialties Details Why Contact Mariela Brothers  Kelvin DOTSON MD Family Medicine Schedule an appointment as soon as possible for a visit in 2 days  [id number] [facility]  [facility] [address]  [phone]               Elpidio Riojas NP  11/22/23 1931

## 2024-04-03 ENCOUNTER — PATIENT MESSAGE (OUTPATIENT)
Dept: PULMONOLOGY | Facility: CLINIC | Age: 34
End: 2024-04-03
Payer: MEDICAID

## 2025-06-03 ENCOUNTER — HOSPITAL ENCOUNTER (EMERGENCY)
Facility: HOSPITAL | Age: 35
Discharge: HOME | End: 2025-06-03
Attending: EMERGENCY MEDICINE
Payer: MEDICAID

## 2025-06-03 VITALS
WEIGHT: 175.06 LBS | HEIGHT: 65 IN | BODY MASS INDEX: 29.17 KG/M2 | RESPIRATION RATE: 20 BRPM | SYSTOLIC BLOOD PRESSURE: 126 MMHG | DIASTOLIC BLOOD PRESSURE: 84 MMHG | OXYGEN SATURATION: 98 % | TEMPERATURE: 99 F | HEART RATE: 100 BPM
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
## 2025-06-03 DIAGNOSIS — M54.50 ACUTE LOW BACK PAIN, UNSPECIFIED BACK PAIN LATERALITY, UNSPECIFIED WHETHER SCIATICA PRESENT: ICD-10-CM

## 2025-06-03 PROCEDURE — 96372 THER/PROPH/DIAG INJ SC/IM: CPT | Performed by: NURSE PRACTITIONER

## 2025-06-03 PROCEDURE — 63600175 PHARM REV CODE 636 W HCPCS: Mod: JZ,TB,ER | Performed by: NURSE PRACTITIONER

## 2025-06-03 PROCEDURE — 99285 EMERGENCY DEPT VISIT HI MDM: CPT | Mod: 25,ER

## 2025-06-03 RX ORDER — CYCLOBENZAPRINE HCL 10 MG
10 TABLET ORAL 3 TIMES DAILY PRN
Qty: 15 TABLET | Refills: 0 | Status: SHIPPED | OUTPATIENT
Start: 2025-06-03 | End: 2025-06-08

## 2025-06-03 RX ORDER — NAPROXEN 500 MG/1
500 TABLET ORAL 2 TIMES DAILY WITH MEALS
Qty: 10 TABLET | Refills: 0 | Status: SHIPPED | OUTPATIENT
Start: 2025-06-03 | End: 2025-06-08

## 2025-06-03 RX ORDER — KETOROLAC TROMETHAMINE 30 MG/ML
60 INJECTION, SOLUTION INTRAMUSCULAR; INTRAVENOUS
Status: COMPLETED | OUTPATIENT
Start: 2025-06-03 | End: 2025-06-03

## 2025-06-03 RX ADMIN — KETOROLAC TROMETHAMINE 60 MG: 30 INJECTION, SOLUTION INTRAMUSCULAR at 02:06

## 2025-06-03 NOTE — ED PROVIDER NOTES
Encounter Date: 6/3/2025       History     Chief Complaint   Patient presents with    Back Pain     Lower back pain radiating down both legs. Fell 1 week ago slipped down stairs and again this am     The history is provided by the patient.   Back Pain   This is a new problem. The current episode started today (Slip and fall while walking upstairs today injuring lower back.  States experience this same fall while walking upstairs 1 week ago.  Has tried nothing for the symptoms.). The problem occurs constantly. The problem has been unchanged. The pain is associated with falling. The pain is present in the lumbar spine. The pain radiates to the left leg and right leg. The symptoms are aggravated by bending, twisting and certain positions. Pertinent negatives include no chest pain, no fever, no numbness, no headaches, no abdominal pain, no bowel incontinence, no bladder incontinence, no dysuria, no pelvic pain, no paresthesias, no tingling and no weakness. Associated symptoms comments: She denies hitting her head or loss of consciousness.. She has tried nothing for the symptoms.     Review of patient's allergies indicates:  No Known Allergies  Past Medical History:   Diagnosis Date    ADHD (attention deficit hyperactivity disorder)     Asthma     Hypertension      Past Surgical History:   Procedure Laterality Date    NO PAST SURGERIES       Family History   Problem Relation Name Age of Onset    Hypertension Mother      Diabetes Maternal Grandmother       Social History[1]  Review of Systems   Constitutional:  Negative for chills, fatigue and fever.   Eyes:  Negative for pain.   Respiratory:  Negative for cough and shortness of breath.    Cardiovascular:  Negative for chest pain.   Gastrointestinal:  Negative for abdominal pain, bowel incontinence, nausea and vomiting.   Genitourinary:  Negative for bladder incontinence, difficulty urinating, dysuria, flank pain, hematuria and pelvic pain.   Musculoskeletal:  Positive  for back pain. Negative for myalgias and neck pain.   Skin:  Negative for rash and wound.   Neurological:  Negative for tingling, syncope, weakness, numbness, headaches and paresthesias.   All other systems reviewed and are negative.      Physical Exam     Initial Vitals [06/03/25 1338]   BP Pulse Resp Temp SpO2   126/84 100 20 98.6 °F (37 °C) 98 %      MAP       --         Physical Exam    Nursing note and vitals reviewed.  Constitutional: She is not diaphoretic. She is cooperative.  Non-toxic appearance. She does not have a sickly appearance. No distress.   HENT:   Head: Normocephalic and atraumatic.   Neck: Neck supple.   Normal range of motion.  Cardiovascular:  Normal rate and regular rhythm.           Pulmonary/Chest: Breath sounds normal. No respiratory distress.   Abdominal: Abdomen is soft. There is no abdominal tenderness.   Musculoskeletal:         General: Normal range of motion.      Cervical back: Normal, normal range of motion and neck supple.      Thoracic back: Normal.      Lumbar back: Tenderness present. No swelling, edema, deformity or signs of trauma.      Comments: Ambulatory and weight-bearing without difficulty.     Neurological: She is alert and oriented to person, place, and time. She has normal strength. No sensory deficit. GCS score is 15. GCS eye subscore is 4. GCS verbal subscore is 5. GCS motor subscore is 6.   Skin: Skin is warm and dry. Capillary refill takes less than 2 seconds.         ED Course   Procedures  Labs Reviewed - No data to display       Imaging Results              CT Lumbar Spine Without Contrast (Final result)  Result time 06/03/25 15:15:51      Final result by Grant Garrido MD (06/03/25 15:15:51)                   Impression:      No fracture or traumatic malalignment of the lumbar spine.    All CT scans at this facility are performed  using dose modulation techniques as appropriate to performed exam including the following:  automated exposure control;  adjustment of mA and/or kV according to the patients size (this includes techniques or standardized protocols for targeted exams where dose is matched to indication/reason for exam: i.e. extremities or head);  iterative reconstruction technique.      Electronically signed by: Grant Garrido  Date:    06/03/2025  Time:    15:15               Narrative:    EXAMINATION:  CT LUMBAR SPINE WITHOUT CONTRAST    CLINICAL HISTORY:  Low back pain, trauma;    TECHNIQUE:  Low-dose CT images obtained throughout the region of the lumbar spine.  Axial, sagittal and coronal reformations were performed.  Contrast was not administered.    COMPARISON:  None.    FINDINGS:  The vertebral bodies are normal in height and morphology without evidence of fracture or osseous destructive process.    Normal sagittal alignment is preserved.  No spondylolisthesis.    Mild degenerative changes without evidence of bony spinal canal stenosis or high grade neuroforaminal narrowing.    Limited evaluation of the intraspinal contents demonstrates no hematoma or mass.    Paraspinal soft tissues exhibit no acute abnormalities.                                       Medications   ketorolac injection 60 mg (60 mg Intramuscular Given 6/3/25 1407)     Medical Decision Making  Amount and/or Complexity of Data Reviewed  Radiology: ordered.    Risk  Prescription drug management.                   Results reviewed and discussed with patient she verbalized understanding with no further concerns.  She reports improvement of symptoms throughout ER visit after medication administration.  Denies bladder/bowel dysfunction, numbness, head injury, loss of consciousness.  Prescriptions provided.  Discussed outpatient follow-up with PCP.  Discussed signs symptoms to return to ER.  Patient agrees with plan and voiced no further concerns.    I discussed with patient  that evaluation in the ED does not suggest any emergent or life threatening medical conditions requiring  immediate intervention beyond what was provided in the ED, and I believe patient is safe for discharge. Regardless, an unremarkable evaluation in the ED does not preclude the development or presence of a serious of life threatening condition. As such, patient was instructed to return immediately for any worsening or change in current symptoms.                     Clinical Impression:  Final diagnoses:  [M54.50] Acute low back pain, unspecified back pain laterality, unspecified whether sciatica present  [W19.XXXA] Fall, initial encounter (Primary)          ED Disposition Condition    Discharge Stable          ED Prescriptions       Medication Sig Dispense Start Date End Date Auth. Provider    naproxen (NAPROSYN) 500 MG tablet Take 1 tablet (500 mg total) by mouth 2 (two) times daily with meals. for 5 days 10 tablet 6/3/2025 6/8/2025 Norman Kelly NP    cyclobenzaprine (FLEXERIL) 10 MG tablet Take 1 tablet (10 mg total) by mouth 3 (three) times daily as needed for Muscle spasms. 15 tablet 6/3/2025 6/8/2025 Norman Kelly NP          Follow-up Information       Follow up With Specialties Details Why Contact Info    Kelvin Brothers MD Family Medicine In 2 days  [address] [facility]  [facility] [address]  [phone]      [facility] - Emergency Dept Emergency Medicine  As needed, If symptoms worsen [address]  Emergency Department  [facility] [id number] [phone]                   [1]   Social History  Tobacco Use    Smoking status: Every Day     Current packs/day: 0.50     Types: Cigarettes    Smokeless tobacco: Never   Substance Use Topics    Alcohol use: Yes    Drug use: Not Currently        Norman Kelly NP  06/03/25 3609